# Patient Record
Sex: MALE | Race: WHITE | NOT HISPANIC OR LATINO | Employment: OTHER | URBAN - METROPOLITAN AREA
[De-identification: names, ages, dates, MRNs, and addresses within clinical notes are randomized per-mention and may not be internally consistent; named-entity substitution may affect disease eponyms.]

---

## 2020-11-13 ENCOUNTER — OFFICE VISIT (OUTPATIENT)
Dept: URGENT CARE | Facility: CLINIC | Age: 85
End: 2020-11-13
Payer: COMMERCIAL

## 2020-11-13 VITALS
DIASTOLIC BLOOD PRESSURE: 83 MMHG | HEIGHT: 72 IN | TEMPERATURE: 99 F | WEIGHT: 170 LBS | RESPIRATION RATE: 18 BRPM | SYSTOLIC BLOOD PRESSURE: 128 MMHG | OXYGEN SATURATION: 96 % | HEART RATE: 74 BPM | BODY MASS INDEX: 23.03 KG/M2

## 2020-11-13 DIAGNOSIS — L30.4 INTERTRIGO: Primary | ICD-10-CM

## 2020-11-13 PROCEDURE — 99203 OFFICE O/P NEW LOW 30 MIN: CPT | Mod: S$GLB,,, | Performed by: PHYSICIAN ASSISTANT

## 2020-11-13 PROCEDURE — 99203 PR OFFICE/OUTPT VISIT, NEW, LEVL III, 30-44 MIN: ICD-10-PCS | Mod: S$GLB,,, | Performed by: PHYSICIAN ASSISTANT

## 2020-11-13 RX ORDER — POTASSIUM CHLORIDE 750 MG/1
10 TABLET, EXTENDED RELEASE ORAL
COMMUNITY
End: 2023-01-23 | Stop reason: DRUGHIGH

## 2020-11-13 RX ORDER — CHOLECALCIFEROL (VITAMIN D3) 50 MCG
2000 TABLET ORAL
COMMUNITY
End: 2023-01-23 | Stop reason: DRUGHIGH

## 2020-11-13 RX ORDER — ATORVASTATIN CALCIUM 80 MG/1
80 TABLET, FILM COATED ORAL
COMMUNITY
End: 2023-01-23 | Stop reason: DRUGHIGH

## 2020-11-13 RX ORDER — SOTALOL HYDROCHLORIDE 80 MG/1
80 TABLET ORAL
COMMUNITY
End: 2023-01-23

## 2020-11-13 RX ORDER — FLUCONAZOLE 200 MG/1
200 TABLET ORAL ONCE
Qty: 1 TABLET | Refills: 0 | Status: SHIPPED | OUTPATIENT
Start: 2020-11-13 | End: 2020-11-13

## 2020-11-13 RX ORDER — DOXYLAMINE SUCCINATE 25 MG
TABLET ORAL 2 TIMES DAILY
Qty: 141 G | Refills: 1 | Status: SHIPPED | OUTPATIENT
Start: 2020-11-13 | End: 2023-01-23

## 2020-11-13 RX ORDER — AMLODIPINE BESYLATE 10 MG/1
10 TABLET ORAL
COMMUNITY
End: 2023-01-23 | Stop reason: DRUGHIGH

## 2020-11-13 RX ORDER — LOSARTAN POTASSIUM 50 MG/1
50 TABLET ORAL DAILY
COMMUNITY
Start: 2020-09-18 | End: 2023-01-23 | Stop reason: DRUGHIGH

## 2020-11-13 RX ORDER — METOPROLOL SUCCINATE 25 MG/1
TABLET, EXTENDED RELEASE ORAL
COMMUNITY
Start: 2020-11-05 | End: 2020-11-13

## 2020-11-13 RX ORDER — DOXYLAMINE SUCCINATE 25 MG
TABLET ORAL 2 TIMES DAILY
Qty: 141 G | Refills: 1 | Status: SHIPPED | OUTPATIENT
Start: 2020-11-13 | End: 2020-11-13

## 2020-11-13 RX ORDER — LEVOTHYROXINE SODIUM 50 UG/1
50 TABLET ORAL EVERY MORNING
COMMUNITY
Start: 2020-11-05 | End: 2022-09-08 | Stop reason: SDUPTHER

## 2020-11-13 RX ORDER — LANOLIN ALCOHOL/MO/W.PET/CERES
400 CREAM (GRAM) TOPICAL NIGHTLY
COMMUNITY

## 2020-11-13 NOTE — PROGRESS NOTES
Subjective:       Patient ID: Yunier Aguillon is a 89 y.o. male.    Vitals:  height is 6' (1.829 m) and weight is 77.1 kg (170 lb). His oral temperature is 98.7 °F (37.1 °C). His blood pressure is 128/83 and his pulse is 74. His respiration is 18 and oxygen saturation is 96%.     Chief Complaint: Rash    89 y.o. with hx HTN, hyperlipidemia and cad with rash to groin for 2 days. Has mild urinary incontinence -Started leaking urine , wife bough humana pads to put in underwear and Patient has developed a rash.  Rash is not painful or itchy.  No fever or drainage     Rash  This is a new problem. The problem has been gradually worsening since onset. The affected locations include the groin (groin). The rash is characterized by redness. Associated with: humana diaper pads. Pertinent negatives include no cough, diarrhea, eye pain, fever, shortness of breath or vomiting. Past treatments include antibiotic cream (antibiotic cream). The treatment provided no relief.       Constitution: Negative. Negative for sweating and fever.   HENT: Negative.  Negative for ear pain and ear discharge.    Neck: negative. Negative for neck pain and neck stiffness.   Cardiovascular: Negative.  Negative for chest pain and palpitations.   Eyes: Negative.  Negative for eye discharge and eye pain.   Respiratory: Negative.  Negative for cough and shortness of breath.    Gastrointestinal: Negative.  Negative for abdominal pain, nausea, vomiting and diarrhea.   Genitourinary: Negative.  Negative for dysuria, urgency, urine decreased and flank pain.   Musculoskeletal: Negative.    Skin: Positive for rash. Negative for wound and erythema.   Allergic/Immunologic: Negative.    Neurological: Negative.  Negative for headaches.   Hematologic/Lymphatic: Negative.    Psychiatric/Behavioral: Negative.        Objective:      Physical Exam   Constitutional: He is oriented to person, place, and time. He appears well-developed.   HENT:   Head: Normocephalic and  atraumatic. Head is without abrasion, without contusion and without laceration.   Ears:   Right Ear: External ear normal.   Left Ear: External ear normal.   Nose: Nose normal.   Mouth/Throat: Oropharynx is clear and moist and mucous membranes are normal.   Eyes: Pupils are equal, round, and reactive to light. Conjunctivae, EOM and lids are normal.   Neck: Trachea normal, full passive range of motion without pain and phonation normal. Neck supple.   Cardiovascular: Normal rate, regular rhythm and normal heart sounds.   Pulmonary/Chest: Effort normal and breath sounds normal. No stridor. No respiratory distress.   Musculoskeletal: Normal range of motion.   Neurological: He is alert and oriented to person, place, and time.   Skin: Skin is warm. abrasion, burn, bruising, erythema and ecchymosis     Psychiatric: His speech is normal and behavior is normal. Judgment and thought content normal.   Nursing note and vitals reviewed.        Assessment:       1. Intertrigo        Plan:       tx for intertrigo. Given home care instructions. Recommend f/u with PCP early next week for recheck, miley if worsening in any way.   Intertrigo    Other orders  -     miconazole (MICOTIN) 2 % cream; Apply topically 2 (two) times daily.  Dispense: 141 g; Refill: 1  -     fluconazole (DIFLUCAN) 200 MG Tab; Take 1 tablet (200 mg total) by mouth once. for 1 dose  Dispense: 1 tablet; Refill: 0        [facility]         Patient Instructions     Take oral pill and cream 2 x daily for at least 14 days or resolved. Clean with regular soap and water, pat dry thoroughly prior to cream. Use barrier cream in the future to prevent such as zinc oxide. needs repeat eval if any fever, spreading redness, purulent drainage. Candida Skin Infection (Adult)  Candida is type of yeast. It grows naturally on the skin and in the mouth. If it grows out of control, it can cause an infection. Candida can cause infections in the  genital area, skin folds, in the mouth, and under the breasts. Anyone can get this infection. It is more common in a person with a weak immune system, such as from diabetes, HIV, or cancer. Its also more common in someone who has been on antibiotic therapy. And its more common people who are overweight or who have incontinence. Wearing tight-fitting clothing and taking part in activities with lots of skin-to-skin contact can also put you at risk.  Candida causes the skin to become bright red and inflamed. The border of the infected part of the skin is often raised. The infection causes pain and itching. Sometimes the skin peels and bleeds. In the mouth, candida is called thrush, and may cause white thickened areas.  A Candida rash is most often treated with an antifungal cream or ointment. The rash will clear a few days after starting the medicine. Infections that dont go away may need a prescription medicine. In rare cases, a bacterial infection can also occur.  Home care  Your healthcare provider will recommend an antifungal cream or ointment for the rash. He or she may also prescribe a medicine for the itch. Follow all instructions for using these medicines. Dont use cornstarch powder. Cornstarch can cause the Candida infection to get worse.  General care:  · Keep your skin clean by washing the area twice a day.  · Use the cream as directed until your rash is gone. Once the skin has healed, keep it dry to prevent another infection.   · If you are overweight, talk with your healthcare provider about a plan to lose excess weight.  · Avoid clothes that fit tightly.  Follow-up care  Follow up with your healthcare provider, or as advised. Your rash will clear in 7 to 14 days. Call your healthcare provider if the rash is not gone after 14 days.  When to seek medical advice  Call your healthcare provider right away if any of these occur:  · Pain or redness that gets worse or spreads  · Fluid coming from the  skin  · Yellow crusts on the skin  · Fever of 100.4°F (38°C) or higher, or as directed by your healthcare provider  Date Last Reviewed: 9/1/2016  © 3901-8330 [employer]. [address] [place] [address], [place] [address]. All rights reserved. This information is not intended as a substitute for professional medical care. Always follow your healthcare professional's instructions.

## 2020-11-13 NOTE — PATIENT INSTRUCTIONS
Take oral pill and cream 2 x daily for at least 14 days or resolved. Clean with regular soap and water, pat dry thoroughly prior to cream. Use barrier cream in the future to prevent such as zinc oxide. needs repeat eval if any fever, spreading redness, purulent drainage. Candida Skin Infection (Adult)  Candida is type of yeast. It grows naturally on the skin and in the mouth. If it grows out of control, it can cause an infection. Candida can cause infections in the genital area, skin folds, in the mouth, and under the breasts. Anyone can get this infection. It is more common in a person with a weak immune system, such as from diabetes, HIV, or cancer. Its also more common in someone who has been on antibiotic therapy. And its more common people who are overweight or who have incontinence. Wearing tight-fitting clothing and taking part in activities with lots of skin-to-skin contact can also put you at risk.  Candida causes the skin to become bright red and inflamed. The border of the infected part of the skin is often raised. The infection causes pain and itching. Sometimes the skin peels and bleeds. In the mouth, candida is called thrush, and may cause white thickened areas.  A Candida rash is most often treated with an antifungal cream or ointment. The rash will clear a few days after starting the medicine. Infections that dont go away may need a prescription medicine. In rare cases, a bacterial infection can also occur.  Home care  Your healthcare provider will recommend an antifungal cream or ointment for the rash. He or she may also prescribe a medicine for the itch. Follow all instructions for using these medicines. Dont use cornstarch powder. Cornstarch can cause the Candida infection to get worse.  General care:  · Keep your skin clean by washing the area twice a day.  · Use the cream as directed until your rash is gone. Once the skin has healed, keep it dry to prevent another infection.   · If you are  overweight, talk with your healthcare provider about a plan to lose excess weight.  · Avoid clothes that fit tightly.  Follow-up care  Follow up with your healthcare provider, or as advised. Your rash will clear in 7 to 14 days. Call your healthcare provider if the rash is not gone after 14 days.  When to seek medical advice  Call your healthcare provider right away if any of these occur:  · Pain or redness that gets worse or spreads  · Fluid coming from the skin  · Yellow crusts on the skin  · Fever of 100.4°F (38°C) or higher, or as directed by your healthcare provider  Date Last Reviewed: 9/1/2016  © 8768-7119 [employer]. [address] [place] [address], [place] [address]. All rights reserved. This information is not intended as a substitute for professional medical care. Always follow your healthcare professional's instructions.

## 2020-11-16 ENCOUNTER — TELEPHONE (OUTPATIENT)
Dept: URGENT CARE | Facility: CLINIC | Age: 85
End: 2020-11-16

## 2020-11-20 ENCOUNTER — TELEPHONE (OUTPATIENT)
Dept: URGENT CARE | Facility: CLINIC | Age: 85
End: 2020-11-20

## 2020-11-20 NOTE — TELEPHONE ENCOUNTER
Courtesy call to check on patient. No answer. Left message to return call to clinic if any questions or concerns. [facility] Garduno

## 2020-12-03 ENCOUNTER — LAB VISIT (OUTPATIENT)
Dept: PRIMARY CARE CLINIC | Facility: OTHER | Age: 85
End: 2020-12-03
Attending: INTERNAL MEDICINE
Payer: MEDICARE

## 2020-12-03 DIAGNOSIS — Z03.818 ENCOUNTER FOR OBSERVATION FOR SUSPECTED EXPOSURE TO OTHER BIOLOGICAL AGENTS RULED OUT: Primary | ICD-10-CM

## 2020-12-03 PROCEDURE — U0003 INFECTIOUS AGENT DETECTION BY NUCLEIC ACID (DNA OR RNA); SEVERE ACUTE RESPIRATORY SYNDROME CORONAVIRUS 2 (SARS-COV-2) (CORONAVIRUS DISEASE [COVID-19]), AMPLIFIED PROBE TECHNIQUE, MAKING USE OF HIGH THROUGHPUT TECHNOLOGIES AS DESCRIBED BY CMS-2020-01-R: HCPCS | Mod: ST72

## 2020-12-07 LAB — SARS-COV-2 RNA RESP QL NAA+PROBE: NOT DETECTED

## 2021-01-14 ENCOUNTER — IMMUNIZATION (OUTPATIENT)
Dept: INTERNAL MEDICINE | Facility: CLINIC | Age: 86
End: 2021-01-14
Payer: MEDICARE

## 2021-01-14 DIAGNOSIS — Z23 NEED FOR VACCINATION: ICD-10-CM

## 2021-01-14 PROCEDURE — 91300 COVID-19, MRNA, LNP-S, PF, 30 MCG/0.3 ML DOSE VACCINE: CPT | Mod: PBBFAC | Performed by: FAMILY MEDICINE

## 2021-01-17 ENCOUNTER — OFFICE VISIT (OUTPATIENT)
Dept: URGENT CARE | Facility: CLINIC | Age: 86
End: 2021-01-17
Payer: MEDICARE

## 2021-01-17 VITALS
HEART RATE: 98 BPM | OXYGEN SATURATION: 99 % | RESPIRATION RATE: 16 BRPM | TEMPERATURE: 99 F | SYSTOLIC BLOOD PRESSURE: 107 MMHG | DIASTOLIC BLOOD PRESSURE: 65 MMHG

## 2021-01-17 DIAGNOSIS — S91.301A AVULSION OF SKIN OF RIGHT FOOT, INITIAL ENCOUNTER: Primary | ICD-10-CM

## 2021-01-17 PROCEDURE — 1126F AMNT PAIN NOTED NONE PRSNT: CPT | Mod: S$GLB,,, | Performed by: NURSE PRACTITIONER

## 2021-01-17 PROCEDURE — 1126F PR PAIN SEVERITY QUANTIFIED, NO PAIN PRESENT: ICD-10-PCS | Mod: S$GLB,,, | Performed by: NURSE PRACTITIONER

## 2021-01-17 PROCEDURE — 99213 OFFICE O/P EST LOW 20 MIN: CPT | Mod: S$GLB,,, | Performed by: NURSE PRACTITIONER

## 2021-01-17 PROCEDURE — 99213 PR OFFICE/OUTPT VISIT, EST, LEVL III, 20-29 MIN: ICD-10-PCS | Mod: S$GLB,,, | Performed by: NURSE PRACTITIONER

## 2021-01-21 ENCOUNTER — TELEPHONE (OUTPATIENT)
Dept: URGENT CARE | Facility: CLINIC | Age: 86
End: 2021-01-21

## 2021-02-04 ENCOUNTER — IMMUNIZATION (OUTPATIENT)
Dept: INTERNAL MEDICINE | Facility: CLINIC | Age: 86
End: 2021-02-04
Payer: MEDICARE

## 2021-02-04 DIAGNOSIS — Z23 NEED FOR VACCINATION: Primary | ICD-10-CM

## 2021-02-04 PROCEDURE — 91300 COVID-19, MRNA, LNP-S, PF, 30 MCG/0.3 ML DOSE VACCINE: CPT | Mod: PBBFAC | Performed by: FAMILY MEDICINE

## 2021-02-04 PROCEDURE — 0002A COVID-19, MRNA, LNP-S, PF, 30 MCG/0.3 ML DOSE VACCINE: CPT | Mod: PBBFAC | Performed by: FAMILY MEDICINE

## 2021-04-16 ENCOUNTER — OFFICE VISIT (OUTPATIENT)
Dept: URGENT CARE | Facility: CLINIC | Age: 86
End: 2021-04-16
Payer: MEDICARE

## 2021-04-16 VITALS
SYSTOLIC BLOOD PRESSURE: 150 MMHG | HEART RATE: 84 BPM | WEIGHT: 200 LBS | DIASTOLIC BLOOD PRESSURE: 87 MMHG | TEMPERATURE: 98 F | HEIGHT: 72 IN | OXYGEN SATURATION: 99 % | BODY MASS INDEX: 27.09 KG/M2 | RESPIRATION RATE: 16 BRPM

## 2021-04-16 DIAGNOSIS — T07.XXXA ABRASIONS OF MULTIPLE SITES: Primary | ICD-10-CM

## 2021-04-16 PROBLEM — I70.0 ATHEROSCLEROSIS OF AORTA: Status: ACTIVE | Noted: 2017-04-17

## 2021-04-16 PROBLEM — F01.50 VASCULAR DEMENTIA WITHOUT BEHAVIORAL DISTURBANCE: Status: ACTIVE | Noted: 2020-12-07

## 2021-04-16 PROBLEM — E03.9 HYPOTHYROIDISM, UNSPECIFIED: Status: ACTIVE | Noted: 2017-04-22

## 2021-04-16 PROBLEM — I10 HYPERTENSION: Status: ACTIVE | Noted: 2021-04-16

## 2021-04-16 PROBLEM — G47.30 SLEEP APNEA: Status: ACTIVE | Noted: 2019-07-17

## 2021-04-16 PROBLEM — I63.9 CARDIOEMBOLIC STROKE: Status: ACTIVE | Noted: 2020-12-07

## 2021-04-16 PROBLEM — I35.0 AORTIC STENOSIS, SEVERE: Status: ACTIVE | Noted: 2019-07-24

## 2021-04-16 PROBLEM — I48.91 ATRIAL FIBRILLATION: Status: ACTIVE | Noted: 2018-12-17

## 2021-04-16 PROBLEM — E78.5 HYPERLIPIDEMIA: Status: ACTIVE | Noted: 2019-07-17

## 2021-04-16 PROBLEM — N28.1 RENAL CYST: Status: ACTIVE | Noted: 2020-01-05

## 2021-04-16 PROBLEM — J45.909 ASTHMA: Status: ACTIVE | Noted: 2021-04-16

## 2021-04-16 PROBLEM — R26.89 BALANCE DISORDER: Status: ACTIVE | Noted: 2019-08-29

## 2021-04-16 PROCEDURE — 3288F FALL RISK ASSESSMENT DOCD: CPT | Mod: CPTII,S$GLB,, | Performed by: EMERGENCY MEDICINE

## 2021-04-16 PROCEDURE — 3288F PR FALLS RISK ASSESSMENT DOCUMENTED: ICD-10-PCS | Mod: CPTII,S$GLB,, | Performed by: EMERGENCY MEDICINE

## 2021-04-16 PROCEDURE — 1101F PR PT FALLS ASSESS DOC 0-1 FALLS W/OUT INJ PAST YR: ICD-10-PCS | Mod: CPTII,S$GLB,, | Performed by: EMERGENCY MEDICINE

## 2021-04-16 PROCEDURE — 1126F AMNT PAIN NOTED NONE PRSNT: CPT | Mod: S$GLB,,, | Performed by: EMERGENCY MEDICINE

## 2021-04-16 PROCEDURE — 1101F PT FALLS ASSESS-DOCD LE1/YR: CPT | Mod: CPTII,S$GLB,, | Performed by: EMERGENCY MEDICINE

## 2021-04-16 PROCEDURE — 1126F PR PAIN SEVERITY QUANTIFIED, NO PAIN PRESENT: ICD-10-PCS | Mod: S$GLB,,, | Performed by: EMERGENCY MEDICINE

## 2021-04-16 PROCEDURE — 99213 PR OFFICE/OUTPT VISIT, EST, LEVL III, 20-29 MIN: ICD-10-PCS | Mod: S$GLB,,, | Performed by: EMERGENCY MEDICINE

## 2021-04-16 PROCEDURE — 99213 OFFICE O/P EST LOW 20 MIN: CPT | Mod: S$GLB,,, | Performed by: EMERGENCY MEDICINE

## 2021-04-19 ENCOUNTER — TELEPHONE (OUTPATIENT)
Dept: URGENT CARE | Facility: CLINIC | Age: 86
End: 2021-04-19

## 2021-05-27 ENCOUNTER — OFFICE VISIT (OUTPATIENT)
Dept: URGENT CARE | Facility: CLINIC | Age: 86
End: 2021-05-27
Payer: MEDICARE

## 2021-05-27 VITALS
RESPIRATION RATE: 16 BRPM | BODY MASS INDEX: 28.14 KG/M2 | HEIGHT: 69 IN | SYSTOLIC BLOOD PRESSURE: 142 MMHG | DIASTOLIC BLOOD PRESSURE: 83 MMHG | TEMPERATURE: 98 F | HEART RATE: 86 BPM | OXYGEN SATURATION: 97 % | WEIGHT: 190 LBS

## 2021-05-27 DIAGNOSIS — R09.82 ALLERGIC RHINITIS WITH POSTNASAL DRIP: ICD-10-CM

## 2021-05-27 DIAGNOSIS — J20.9 ACUTE BRONCHITIS, UNSPECIFIED ORGANISM: Primary | ICD-10-CM

## 2021-05-27 DIAGNOSIS — J30.9 ALLERGIC RHINITIS WITH POSTNASAL DRIP: ICD-10-CM

## 2021-05-27 DIAGNOSIS — R05.9 COUGH: ICD-10-CM

## 2021-05-27 PROCEDURE — 1125F AMNT PAIN NOTED PAIN PRSNT: CPT | Mod: S$GLB,,, | Performed by: NURSE PRACTITIONER

## 2021-05-27 PROCEDURE — 99214 PR OFFICE/OUTPT VISIT, EST, LEVL IV, 30-39 MIN: ICD-10-PCS | Mod: S$GLB,,, | Performed by: NURSE PRACTITIONER

## 2021-05-27 PROCEDURE — 1125F PR PAIN SEVERITY QUANTIFIED, PAIN PRESENT: ICD-10-PCS | Mod: S$GLB,,, | Performed by: NURSE PRACTITIONER

## 2021-05-27 PROCEDURE — 99214 OFFICE O/P EST MOD 30 MIN: CPT | Mod: S$GLB,,, | Performed by: NURSE PRACTITIONER

## 2021-05-27 RX ORDER — FLUTICASONE PROPIONATE 50 MCG
2 SPRAY, SUSPENSION (ML) NASAL DAILY
Qty: 16 G | Refills: 0 | Status: SHIPPED | OUTPATIENT
Start: 2021-05-27 | End: 2021-06-26

## 2021-05-27 RX ORDER — DOXYCYCLINE HYCLATE 100 MG
100 TABLET ORAL 2 TIMES DAILY
Qty: 14 TABLET | Refills: 0 | Status: SHIPPED | OUTPATIENT
Start: 2021-05-27 | End: 2021-06-03

## 2021-05-30 ENCOUNTER — TELEPHONE (OUTPATIENT)
Dept: URGENT CARE | Facility: CLINIC | Age: 86
End: 2021-05-30

## 2021-07-12 ENCOUNTER — OFFICE VISIT (OUTPATIENT)
Dept: URGENT CARE | Facility: CLINIC | Age: 86
End: 2021-07-12
Payer: MEDICARE

## 2021-07-12 VITALS
HEIGHT: 69 IN | RESPIRATION RATE: 12 BRPM | WEIGHT: 218 LBS | TEMPERATURE: 98 F | DIASTOLIC BLOOD PRESSURE: 85 MMHG | SYSTOLIC BLOOD PRESSURE: 127 MMHG | OXYGEN SATURATION: 97 % | HEART RATE: 96 BPM | BODY MASS INDEX: 32.29 KG/M2

## 2021-07-12 DIAGNOSIS — H61.23 HEARING LOSS DUE TO CERUMEN IMPACTION, BILATERAL: Primary | ICD-10-CM

## 2021-07-12 DIAGNOSIS — H92.02 ACUTE OTALGIA, LEFT: ICD-10-CM

## 2021-07-12 PROCEDURE — 1126F AMNT PAIN NOTED NONE PRSNT: CPT | Mod: S$GLB,,, | Performed by: NURSE PRACTITIONER

## 2021-07-12 PROCEDURE — 99214 OFFICE O/P EST MOD 30 MIN: CPT | Mod: S$GLB,,, | Performed by: NURSE PRACTITIONER

## 2021-07-12 PROCEDURE — 99214 PR OFFICE/OUTPT VISIT, EST, LEVL IV, 30-39 MIN: ICD-10-PCS | Mod: S$GLB,,, | Performed by: NURSE PRACTITIONER

## 2021-07-12 PROCEDURE — 1126F PR PAIN SEVERITY QUANTIFIED, NO PAIN PRESENT: ICD-10-PCS | Mod: S$GLB,,, | Performed by: NURSE PRACTITIONER

## 2021-07-12 RX ORDER — OFLOXACIN 3 MG/ML
10 SOLUTION AURICULAR (OTIC) DAILY
Qty: 70 DROP | Refills: 0 | Status: SHIPPED | OUTPATIENT
Start: 2021-07-12 | End: 2021-07-19

## 2021-07-15 ENCOUNTER — TELEPHONE (OUTPATIENT)
Dept: URGENT CARE | Facility: CLINIC | Age: 86
End: 2021-07-15

## 2021-10-17 ENCOUNTER — OFFICE VISIT (OUTPATIENT)
Dept: URGENT CARE | Facility: CLINIC | Age: 86
End: 2021-10-17
Payer: MEDICARE

## 2021-10-17 ENCOUNTER — HOSPITAL ENCOUNTER (OUTPATIENT)
Dept: RADIOLOGY | Facility: CLINIC | Age: 86
Discharge: HOME | End: 2021-10-17
Attending: NURSE PRACTITIONER
Payer: MEDICARE

## 2021-10-17 VITALS
HEART RATE: 94 BPM | DIASTOLIC BLOOD PRESSURE: 90 MMHG | SYSTOLIC BLOOD PRESSURE: 139 MMHG | RESPIRATION RATE: 16 BRPM | OXYGEN SATURATION: 98 % | HEIGHT: 71 IN | TEMPERATURE: 98 F | WEIGHT: 212 LBS | BODY MASS INDEX: 29.68 KG/M2

## 2021-10-17 DIAGNOSIS — R05.9 COUGH: ICD-10-CM

## 2021-10-17 DIAGNOSIS — J18.9 PNEUMONIA OF LEFT LOWER LOBE DUE TO INFECTIOUS ORGANISM: ICD-10-CM

## 2021-10-17 DIAGNOSIS — R05.9 COUGH: Primary | ICD-10-CM

## 2021-10-17 LAB
CTP QC/QA: YES
SARS-COV-2 RDRP RESP QL NAA+PROBE: NEGATIVE

## 2021-10-17 PROCEDURE — 71046 XR CHEST PA AND LATERAL: ICD-10-PCS | Mod: S$GLB,,, | Performed by: RADIOLOGY

## 2021-10-17 PROCEDURE — 71046 X-RAY EXAM CHEST 2 VIEWS: CPT | Mod: S$GLB,,, | Performed by: RADIOLOGY

## 2021-10-17 PROCEDURE — U0002 COVID-19 LAB TEST NON-CDC: HCPCS | Mod: QW,S$GLB,, | Performed by: NURSE PRACTITIONER

## 2021-10-17 PROCEDURE — U0002: ICD-10-PCS | Mod: QW,S$GLB,, | Performed by: NURSE PRACTITIONER

## 2021-10-17 PROCEDURE — 1159F MED LIST DOCD IN RCRD: CPT | Mod: CPTII,S$GLB,, | Performed by: NURSE PRACTITIONER

## 2021-10-17 PROCEDURE — 99214 OFFICE O/P EST MOD 30 MIN: CPT | Mod: S$GLB,,, | Performed by: NURSE PRACTITIONER

## 2021-10-17 PROCEDURE — 99214 PR OFFICE/OUTPT VISIT, EST, LEVL IV, 30-39 MIN: ICD-10-PCS | Mod: S$GLB,,, | Performed by: NURSE PRACTITIONER

## 2021-10-17 PROCEDURE — 1159F PR MEDICATION LIST DOCUMENTED IN MEDICAL RECORD: ICD-10-PCS | Mod: CPTII,S$GLB,, | Performed by: NURSE PRACTITIONER

## 2021-10-17 RX ORDER — BENZONATATE 200 MG/1
200 CAPSULE ORAL 3 TIMES DAILY PRN
Qty: 30 CAPSULE | Refills: 0 | Status: SHIPPED | OUTPATIENT
Start: 2021-10-17 | End: 2021-10-27

## 2021-10-17 RX ORDER — DOXYCYCLINE 100 MG/1
100 CAPSULE ORAL EVERY 12 HOURS
Qty: 14 CAPSULE | Refills: 0 | Status: SHIPPED | OUTPATIENT
Start: 2021-10-17 | End: 2021-10-24

## 2021-10-20 ENCOUNTER — TELEPHONE (OUTPATIENT)
Dept: URGENT CARE | Facility: CLINIC | Age: 86
End: 2021-10-20

## 2021-10-26 ENCOUNTER — HOSPITAL ENCOUNTER (EMERGENCY)
Facility: HOSPITAL | Age: 86
Discharge: HOME | End: 2021-10-27
Attending: EMERGENCY MEDICINE
Payer: MEDICARE

## 2021-10-26 ENCOUNTER — HOSPITAL ENCOUNTER (OUTPATIENT)
Dept: RADIOLOGY | Facility: CLINIC | Age: 86
Discharge: HOME | End: 2021-10-26
Attending: PHYSICIAN ASSISTANT
Payer: MEDICARE

## 2021-10-26 ENCOUNTER — OFFICE VISIT (OUTPATIENT)
Dept: URGENT CARE | Facility: CLINIC | Age: 86
End: 2021-10-26
Payer: MEDICARE

## 2021-10-26 VITALS
SYSTOLIC BLOOD PRESSURE: 123 MMHG | HEIGHT: 71 IN | RESPIRATION RATE: 16 BRPM | OXYGEN SATURATION: 98 % | BODY MASS INDEX: 29.68 KG/M2 | TEMPERATURE: 98 F | HEART RATE: 93 BPM | DIASTOLIC BLOOD PRESSURE: 76 MMHG | WEIGHT: 212 LBS

## 2021-10-26 VITALS
OXYGEN SATURATION: 98 % | DIASTOLIC BLOOD PRESSURE: 99 MMHG | SYSTOLIC BLOOD PRESSURE: 160 MMHG | HEIGHT: 71 IN | WEIGHT: 220 LBS | RESPIRATION RATE: 19 BRPM | TEMPERATURE: 99 F | BODY MASS INDEX: 30.8 KG/M2 | HEART RATE: 88 BPM

## 2021-10-26 DIAGNOSIS — I50.9 CONGESTIVE HEART FAILURE, UNSPECIFIED HF CHRONICITY, UNSPECIFIED HEART FAILURE TYPE: Primary | ICD-10-CM

## 2021-10-26 DIAGNOSIS — R06.02 SOB (SHORTNESS OF BREATH): ICD-10-CM

## 2021-10-26 DIAGNOSIS — R06.2 WHEEZING: ICD-10-CM

## 2021-10-26 DIAGNOSIS — R05.9 COUGH: ICD-10-CM

## 2021-10-26 DIAGNOSIS — Z87.01 H/O: PNEUMONIA: Primary | ICD-10-CM

## 2021-10-26 LAB
ALBUMIN SERPL BCP-MCNC: 3.5 G/DL (ref 3.5–5.2)
ALP SERPL-CCNC: 109 U/L (ref 55–135)
ALT SERPL W/O P-5'-P-CCNC: 27 U/L (ref 10–44)
ANION GAP SERPL CALC-SCNC: 13 MMOL/L (ref 8–16)
AST SERPL-CCNC: 21 U/L (ref 10–40)
BASOPHILS # BLD AUTO: 0.02 K/UL (ref 0–0.2)
BASOPHILS NFR BLD: 0.3 % (ref 0–1.9)
BILIRUB SERPL-MCNC: 1.2 MG/DL (ref 0.1–1)
BNP SERPL-MCNC: 163 PG/ML (ref 0–99)
BUN SERPL-MCNC: 21 MG/DL (ref 10–30)
CALCIUM SERPL-MCNC: 9.3 MG/DL (ref 8.7–10.5)
CHLORIDE SERPL-SCNC: 105 MMOL/L (ref 95–110)
CO2 SERPL-SCNC: 23 MMOL/L (ref 23–29)
CREAT SERPL-MCNC: 1.1 MG/DL (ref 0.5–1.4)
DIFFERENTIAL METHOD: ABNORMAL
EOSINOPHIL # BLD AUTO: 0 K/UL (ref 0–0.5)
EOSINOPHIL NFR BLD: 0.4 % (ref 0–8)
ERYTHROCYTE [DISTWIDTH] IN BLOOD BY AUTOMATED COUNT: 13.6 % (ref 11.5–14.5)
EST. GFR  (AFRICAN AMERICAN): >60 ML/MIN/1.73 M^2
EST. GFR  (NON AFRICAN AMERICAN): 58 ML/MIN/1.73 M^2
GLUCOSE SERPL-MCNC: 138 MG/DL (ref 70–110)
HCT VFR BLD AUTO: 39.7 % (ref 40–54)
HGB BLD-MCNC: 13.4 G/DL (ref 14–18)
IMM GRANULOCYTES # BLD AUTO: 0.1 K/UL (ref 0–0.04)
IMM GRANULOCYTES NFR BLD AUTO: 1.3 % (ref 0–0.5)
LACTATE SERPL-SCNC: 2.2 MMOL/L (ref 0.5–2.2)
LYMPHOCYTES # BLD AUTO: 0.8 K/UL (ref 1–4.8)
LYMPHOCYTES NFR BLD: 9.7 % (ref 18–48)
MCH RBC QN AUTO: 31 PG (ref 27–31)
MCHC RBC AUTO-ENTMCNC: 33.8 G/DL (ref 32–36)
MCV RBC AUTO: 92 FL (ref 82–98)
MONOCYTES # BLD AUTO: 0.3 K/UL (ref 0.3–1)
MONOCYTES NFR BLD: 3.9 % (ref 4–15)
NEUTROPHILS # BLD AUTO: 6.6 K/UL (ref 1.8–7.7)
NEUTROPHILS NFR BLD: 84.4 % (ref 38–73)
NRBC BLD-RTO: 0 /100 WBC
PLATELET # BLD AUTO: 180 K/UL (ref 150–450)
PMV BLD AUTO: 8.6 FL (ref 9.2–12.9)
POTASSIUM SERPL-SCNC: 4.3 MMOL/L (ref 3.5–5.1)
PROT SERPL-MCNC: 6.4 G/DL (ref 6–8.4)
RBC # BLD AUTO: 4.32 M/UL (ref 4.6–6.2)
SODIUM SERPL-SCNC: 141 MMOL/L (ref 136–145)
TROPONIN I SERPL DL<=0.01 NG/ML-MCNC: <0.006 NG/ML (ref 0–0.03)
WBC # BLD AUTO: 7.75 K/UL (ref 3.9–12.7)

## 2021-10-26 PROCEDURE — 84484 ASSAY OF TROPONIN QUANT: CPT | Performed by: NURSE PRACTITIONER

## 2021-10-26 PROCEDURE — 1160F RVW MEDS BY RX/DR IN RCRD: CPT | Mod: CPTII,S$GLB,, | Performed by: PHYSICIAN ASSISTANT

## 2021-10-26 PROCEDURE — 94640 PR INHAL RX, AIRWAY OBST/DX SPUTUM INDUCT: ICD-10-PCS | Mod: S$GLB,,, | Performed by: FAMILY MEDICINE

## 2021-10-26 PROCEDURE — 85025 COMPLETE CBC W/AUTO DIFF WBC: CPT | Performed by: NURSE PRACTITIONER

## 2021-10-26 PROCEDURE — 99214 PR OFFICE/OUTPT VISIT, EST, LEVL IV, 30-39 MIN: ICD-10-PCS | Mod: 25,S$GLB,, | Performed by: PHYSICIAN ASSISTANT

## 2021-10-26 PROCEDURE — 1159F PR MEDICATION LIST DOCUMENTED IN MEDICAL RECORD: ICD-10-PCS | Mod: CPTII,S$GLB,, | Performed by: PHYSICIAN ASSISTANT

## 2021-10-26 PROCEDURE — 96372 PR INJECTION,THERAP/PROPH/DIAG2ST, IM OR SUBCUT: ICD-10-PCS | Mod: S$GLB,,, | Performed by: FAMILY MEDICINE

## 2021-10-26 PROCEDURE — 1126F AMNT PAIN NOTED NONE PRSNT: CPT | Mod: CPTII,S$GLB,, | Performed by: PHYSICIAN ASSISTANT

## 2021-10-26 PROCEDURE — 83880 ASSAY OF NATRIURETIC PEPTIDE: CPT | Performed by: NURSE PRACTITIONER

## 2021-10-26 PROCEDURE — 83605 ASSAY OF LACTIC ACID: CPT | Performed by: NURSE PRACTITIONER

## 2021-10-26 PROCEDURE — 1160F PR REVIEW ALL MEDS BY PRESCRIBER/CLIN PHARMACIST DOCUMENTED: ICD-10-PCS | Mod: CPTII,S$GLB,, | Performed by: PHYSICIAN ASSISTANT

## 2021-10-26 PROCEDURE — 93010 ELECTROCARDIOGRAM REPORT: CPT | Mod: ,,, | Performed by: INTERNAL MEDICINE

## 2021-10-26 PROCEDURE — 99285 EMERGENCY DEPT VISIT HI MDM: CPT | Mod: 25

## 2021-10-26 PROCEDURE — 71046 XR CHEST PA AND LATERAL: ICD-10-PCS | Mod: S$GLB,,, | Performed by: RADIOLOGY

## 2021-10-26 PROCEDURE — 96372 THER/PROPH/DIAG INJ SC/IM: CPT | Mod: S$GLB,,, | Performed by: FAMILY MEDICINE

## 2021-10-26 PROCEDURE — 93005 ELECTROCARDIOGRAM TRACING: CPT

## 2021-10-26 PROCEDURE — 71046 X-RAY EXAM CHEST 2 VIEWS: CPT | Mod: S$GLB,,, | Performed by: RADIOLOGY

## 2021-10-26 PROCEDURE — 1159F MED LIST DOCD IN RCRD: CPT | Mod: CPTII,S$GLB,, | Performed by: PHYSICIAN ASSISTANT

## 2021-10-26 PROCEDURE — 99214 OFFICE O/P EST MOD 30 MIN: CPT | Mod: 25,S$GLB,, | Performed by: PHYSICIAN ASSISTANT

## 2021-10-26 PROCEDURE — 63600175 PHARM REV CODE 636 W HCPCS: Performed by: EMERGENCY MEDICINE

## 2021-10-26 PROCEDURE — 1126F PR PAIN SEVERITY QUANTIFIED, NO PAIN PRESENT: ICD-10-PCS | Mod: CPTII,S$GLB,, | Performed by: PHYSICIAN ASSISTANT

## 2021-10-26 PROCEDURE — 93010 EKG 12-LEAD: ICD-10-PCS | Mod: ,,, | Performed by: INTERNAL MEDICINE

## 2021-10-26 PROCEDURE — 94640 AIRWAY INHALATION TREATMENT: CPT | Mod: S$GLB,,, | Performed by: FAMILY MEDICINE

## 2021-10-26 PROCEDURE — 80053 COMPREHEN METABOLIC PANEL: CPT | Performed by: NURSE PRACTITIONER

## 2021-10-26 PROCEDURE — 96374 THER/PROPH/DIAG INJ IV PUSH: CPT

## 2021-10-26 RX ORDER — ALBUTEROL SULFATE 0.63 MG/3ML
1.26 SOLUTION RESPIRATORY (INHALATION)
Status: CANCELLED | OUTPATIENT
Start: 2021-10-26 | End: 2021-10-26

## 2021-10-26 RX ORDER — FUROSEMIDE 10 MG/ML
40 INJECTION INTRAMUSCULAR; INTRAVENOUS
Status: COMPLETED | OUTPATIENT
Start: 2021-10-26 | End: 2021-10-26

## 2021-10-26 RX ORDER — IPRATROPIUM BROMIDE 0.5 MG/2.5ML
0.5 SOLUTION RESPIRATORY (INHALATION)
Status: COMPLETED | OUTPATIENT
Start: 2021-10-26 | End: 2021-10-26

## 2021-10-26 RX ORDER — LEVALBUTEROL INHALATION SOLUTION 1.25 MG/3ML
1.25 SOLUTION RESPIRATORY (INHALATION)
Status: DISCONTINUED | OUTPATIENT
Start: 2021-10-26 | End: 2021-10-26

## 2021-10-26 RX ORDER — AMOXICILLIN AND CLAVULANATE POTASSIUM 875; 125 MG/1; MG/1
1 TABLET, FILM COATED ORAL 2 TIMES DAILY
Qty: 20 TABLET | Refills: 0 | Status: SHIPPED | OUTPATIENT
Start: 2021-10-26 | End: 2021-11-05

## 2021-10-26 RX ORDER — CLINDAMYCIN PHOSPHATE 150 MG/ML
600 INJECTION, SOLUTION INTRAVENOUS
Status: COMPLETED | OUTPATIENT
Start: 2021-10-26 | End: 2021-10-26

## 2021-10-26 RX ADMIN — CLINDAMYCIN PHOSPHATE 600 MG: 150 INJECTION, SOLUTION INTRAVENOUS at 05:10

## 2021-10-26 RX ADMIN — FUROSEMIDE 40 MG: 20 INJECTION, SOLUTION INTRAMUSCULAR; INTRAVENOUS at 11:10

## 2021-10-26 RX ADMIN — IPRATROPIUM BROMIDE 0.5 MG: 0.5 SOLUTION RESPIRATORY (INHALATION) at 05:10

## 2021-10-27 PROBLEM — I50.9 CONGESTIVE HEART FAILURE: Status: ACTIVE | Noted: 2021-10-27

## 2021-10-27 RX ORDER — FUROSEMIDE 20 MG/1
20 TABLET ORAL DAILY
Qty: 3 TABLET | Refills: 0 | Status: ON HOLD | OUTPATIENT
Start: 2021-10-27 | End: 2023-01-26 | Stop reason: HOSPADM

## 2021-11-28 ENCOUNTER — OFFICE VISIT (OUTPATIENT)
Dept: URGENT CARE | Facility: CLINIC | Age: 86
End: 2021-11-28
Payer: MEDICARE

## 2021-11-28 VITALS
HEART RATE: 89 BPM | BODY MASS INDEX: 30.8 KG/M2 | OXYGEN SATURATION: 96 % | WEIGHT: 220 LBS | RESPIRATION RATE: 16 BRPM | DIASTOLIC BLOOD PRESSURE: 69 MMHG | SYSTOLIC BLOOD PRESSURE: 128 MMHG | TEMPERATURE: 98 F | HEIGHT: 71 IN

## 2021-11-28 DIAGNOSIS — R06.2 WHEEZING ON EXPIRATION: Primary | ICD-10-CM

## 2021-11-28 DIAGNOSIS — R11.0 NAUSEA: ICD-10-CM

## 2021-11-28 DIAGNOSIS — R05.3 PERSISTENT COUGH: ICD-10-CM

## 2021-11-28 PROCEDURE — 94640 PR INHAL RX, AIRWAY OBST/DX SPUTUM INDUCT: ICD-10-PCS | Mod: S$GLB,,, | Performed by: PHYSICIAN ASSISTANT

## 2021-11-28 PROCEDURE — 99214 PR OFFICE/OUTPT VISIT, EST, LEVL IV, 30-39 MIN: ICD-10-PCS | Mod: 25,S$GLB,, | Performed by: PHYSICIAN ASSISTANT

## 2021-11-28 PROCEDURE — 94640 AIRWAY INHALATION TREATMENT: CPT | Mod: S$GLB,,, | Performed by: PHYSICIAN ASSISTANT

## 2021-11-28 PROCEDURE — 99214 OFFICE O/P EST MOD 30 MIN: CPT | Mod: 25,S$GLB,, | Performed by: PHYSICIAN ASSISTANT

## 2021-11-28 RX ORDER — METHYLPREDNISOLONE 4 MG/1
TABLET ORAL
Qty: 1 EACH | Refills: 0 | Status: SHIPPED | OUTPATIENT
Start: 2021-11-28 | End: 2023-01-23

## 2021-11-28 RX ORDER — ALBUTEROL SULFATE 0.83 MG/ML
2.5 SOLUTION RESPIRATORY (INHALATION)
Status: COMPLETED | OUTPATIENT
Start: 2021-11-28 | End: 2021-11-28

## 2021-11-28 RX ORDER — ONDANSETRON 4 MG/1
4 TABLET, ORALLY DISINTEGRATING ORAL
Status: COMPLETED | OUTPATIENT
Start: 2021-11-28 | End: 2021-11-28

## 2021-11-28 RX ADMIN — ALBUTEROL SULFATE 2.5 MG: 0.83 SOLUTION RESPIRATORY (INHALATION) at 11:11

## 2021-11-28 RX ADMIN — ONDANSETRON 4 MG: 4 TABLET, ORALLY DISINTEGRATING ORAL at 11:11

## 2021-12-01 ENCOUNTER — TELEPHONE (OUTPATIENT)
Dept: URGENT CARE | Facility: CLINIC | Age: 86
End: 2021-12-01
Payer: OTHER GOVERNMENT

## 2022-01-27 ENCOUNTER — OFFICE VISIT (OUTPATIENT)
Dept: URGENT CARE | Facility: CLINIC | Age: 87
End: 2022-01-27
Payer: MEDICARE

## 2022-01-27 DIAGNOSIS — T14.8XXA AVULSION OF SKIN: Primary | ICD-10-CM

## 2022-01-27 DIAGNOSIS — R05.9 COUGH: ICD-10-CM

## 2022-01-27 PROCEDURE — 99214 PR OFFICE/OUTPT VISIT, EST, LEVL IV, 30-39 MIN: ICD-10-PCS | Mod: S$GLB,,, | Performed by: PHYSICIAN ASSISTANT

## 2022-01-27 PROCEDURE — 99214 OFFICE O/P EST MOD 30 MIN: CPT | Mod: S$GLB,,, | Performed by: PHYSICIAN ASSISTANT

## 2022-01-27 RX ORDER — NAPROXEN SODIUM 220 MG/1
81 TABLET, FILM COATED ORAL DAILY
COMMUNITY

## 2022-01-27 RX ORDER — DONEPEZIL HYDROCHLORIDE 5 MG/1
TABLET, FILM COATED ORAL
COMMUNITY
Start: 2021-03-24 | End: 2023-01-23 | Stop reason: DRUGHIGH

## 2022-01-27 RX ORDER — METOPROLOL SUCCINATE 25 MG/1
25 TABLET, EXTENDED RELEASE ORAL 2 TIMES DAILY
COMMUNITY
Start: 2021-08-11 | End: 2023-01-23 | Stop reason: DRUGHIGH

## 2022-01-27 RX ORDER — ALBUTEROL SULFATE 90 UG/1
AEROSOL, METERED RESPIRATORY (INHALATION)
COMMUNITY
Start: 2021-05-29 | End: 2023-01-23

## 2022-01-27 NOTE — PROGRESS NOTES
"Subjective:       Patient ID: Yunier Aguillon is a 89 y.o. male.    Vitals:  height is 5' 11" (1.803 m) (pended) and weight is 96.2 kg (212 lb) (pended). His tympanic temperature is 97.7 °F (36.5 °C) (pended). His blood pressure is 125/83 (pended) and his pulse is 112 (abnormal, pended). His respiration is 16 (pended) and oxygen saturation is 98% (pended).     Chief Complaint: Hand Injury    89-year-old male presents to urgent care with wife stating that he has a open wound on Right hand.  Accidentally fell off triceps.  Denies any further injuries.  Denies loss of consciousness or head trauma.  Patient ambulates with assistance of a walker.  Also complains of mild cough.  Has not tried any over-the-counter medications.      Hand Injury   His dominant hand is their right hand. The incident occurred 12 to 24 hours ago. The incident occurred at home. The injury mechanism was a fall. The pain is present in the right hand. Quality: No pain. The pain does not radiate. The pain is at a severity of 0/10. The patient is experiencing no pain. Pertinent negatives include no chest pain, muscle weakness, numbness or tingling. Nothing aggravates the symptoms. Treatments tried: Neosporin. The treatment provided no relief.       Cardiovascular: Negative for chest pain.   Respiratory: Positive for cough. Negative for chest tightness and sputum production.    Skin: Positive for wound and avulsion. Negative for erythema.   Neurological: Negative for numbness.       Objective:       Vitals:    01/27/22 1115   BP: (P) 125/83   Pulse: (!) (P) 112   Resp: (P) 16   Temp: (P) 97.7 °F (36.5 °C)   TempSrc: (P) Tympanic   SpO2: (P) 98%   Weight: (P) 96.2 kg (212 lb)   Height: (P) 5' 11" (1.803 m)       Physical Exam   Constitutional: He is oriented to person, place, and time. He appears well-developed and well-nourished. He is not intubated.   HENT:   Head: Normocephalic and atraumatic. Head is without abrasion, without contusion and without " laceration.   Ears:   Right Ear: External ear normal.   Left Ear: External ear normal.   Nose: Nose normal.   Mouth/Throat: Oropharynx is clear and moist and mucous membranes are normal.   Eyes: Conjunctivae, EOM and lids are normal. Pupils are equal, round, and reactive to light.   Neck: Trachea normal and phonation normal. Neck supple.   Cardiovascular: Normal rate, regular rhythm and normal heart sounds.   Pulmonary/Chest: Effort normal and breath sounds normal. No accessory muscle usage or stridor. No apnea, no tachypnea and no bradypnea. He is not intubated. No respiratory distress. He has no decreased breath sounds. He has no wheezes. He has no rhonchi. He has no rales. Chest wall is not dull to percussion. He exhibits no bony tenderness.   Musculoskeletal: Normal range of motion.         General: Normal range of motion.        Hands:    Neurological: He is alert and oriented to person, place, and time.   Skin: Skin is warm, dry, intact and no rash. Capillary refill takes less than 2 seconds. No abrasion, No burn, No bruising, No erythema and No ecchymosis   Psychiatric: He has a normal mood and affect. His speech is normal and behavior is normal. Judgment and thought content normal. Cognition and memory  Nursing note and vitals reviewed.        Assessment:       1. Avulsion of skin    2. Cough          Plan:         Avulsion of skin  -     Laceration Repair    Cough                   Patient Instructions   Cough-- Wife reports that patient has Tessalon Perles at home.  Please take as directed for cough.    Make sure you are getting rest and drinking lots of fluids.    You can use cough drops or Cepacol to soothe your sore throat.     You can also take Elderberry and/or Emergen-C (vitamin C) to help boost your immune system.    Skin avulsion-- Keep your dressing on for48 hours. Do not wet Steri-Strips.  - After 48 hours remove the dressing and gently clean wound with soap and water at least twice daily unless  more frequently soiled, then clean more frequently.    - You may apply topical antibiotic ointment to wound to promote healing.   - Clean old antibiotic ointment away before new application.    - Signs of infection include but not limited to:  Increase in redness, increase in pain, purulent (pus) drainage. Contact clinic or ER if infection concerns arise.   - Keep covered when you are out and about, if the dressing becomes wet, change it immediately. Keep open to air when at home.      - You must understand that you have received an Urgent Care treatment only and that you may be released before all of your medical problems are known or treated.   - You, the patient, will arrange for follow up care as instructed with your primary care provider or recommended specialist.   - If your condition worsens or fails to improve we recommend that you receive another evaluation at the ER immediately or contact your PCP to discuss your concerns, or return here.   - Please do not drive or make any important decisions for 24 hours if you have received any pain medications, sedatives or mood altering drugs during your visit.    Disclaimer: This document was drafted with the use of a voice recognition device and is likely to have sound alike errors.

## 2022-01-27 NOTE — PROCEDURES
Laceration Repair    Date/Time: 1/27/2022 11:15 AM  Performed by: Grace You PA-C  Authorized by: Grace You PA-C   Consent Done: Yes  Consent: Verbal consent obtained. Written consent not obtained.  Risks and benefits: risks, benefits and alternatives were discussed  Consent given by: patient  Patient identity confirmed: name and verbally with patient  Body area: upper extremity  Location details: right hand  Foreign bodies: no foreign bodies  Tendon involvement: none  Nerve involvement: none  Vascular damage: no    Patient sedated: no  Irrigation solution: Peroxide.  Amount of cleaning: standard  Skin closure: Steri-Strips  Patient tolerance: Patient tolerated the procedure well with no immediate complications

## 2022-01-27 NOTE — PATIENT INSTRUCTIONS
Cough-- Wife reports that patient has Tessalon Perles at home.  Please take as directed for cough.    Make sure you are getting rest and drinking lots of fluids.    You can use cough drops or Cepacol to soothe your sore throat.     You can also take Elderberry and/or Emergen-C (vitamin C) to help boost your immune system.    Skin avulsion-- Keep your dressing on for48 hours. Do not wet Steri-Strips.  - After 48 hours remove the dressing and gently clean wound with soap and water at least twice daily unless more frequently soiled, then clean more frequently.    - You may apply topical antibiotic ointment to wound to promote healing.   - Clean old antibiotic ointment away before new application.    - Signs of infection include but not limited to:  Increase in redness, increase in pain, purulent (pus) drainage. Contact clinic or ER if infection concerns arise.   - Keep covered when you are out and about, if the dressing becomes wet, change it immediately. Keep open to air when at home.      - You must understand that you have received an Urgent Care treatment only and that you may be released before all of your medical problems are known or treated.   - You, the patient, will arrange for follow up care as instructed with your primary care provider or recommended specialist.   - If your condition worsens or fails to improve we recommend that you receive another evaluation at the ER immediately or contact your PCP to discuss your concerns, or return here.   - Please do not drive or make any important decisions for 24 hours if you have received any pain medications, sedatives or mood altering drugs during your visit.    Disclaimer: This document was drafted with the use of a voice recognition device and is likely to have sound alike errors.

## 2022-02-05 ENCOUNTER — OFFICE VISIT (OUTPATIENT)
Dept: URGENT CARE | Facility: CLINIC | Age: 87
End: 2022-02-05
Payer: MEDICARE

## 2022-02-05 VITALS
HEIGHT: 69 IN | BODY MASS INDEX: 32.58 KG/M2 | RESPIRATION RATE: 16 BRPM | TEMPERATURE: 99 F | DIASTOLIC BLOOD PRESSURE: 76 MMHG | WEIGHT: 220 LBS | SYSTOLIC BLOOD PRESSURE: 114 MMHG | HEART RATE: 90 BPM | OXYGEN SATURATION: 100 %

## 2022-02-05 DIAGNOSIS — R25.1 OCCASIONAL TREMORS: Primary | ICD-10-CM

## 2022-02-05 DIAGNOSIS — R73.9 ELEVATED BLOOD SUGAR LEVEL: ICD-10-CM

## 2022-02-05 LAB — GLUCOSE SERPL-MCNC: 143 MG/DL (ref 70–110)

## 2022-02-05 PROCEDURE — 82962 POCT GLUCOSE, HAND-HELD DEVICE: ICD-10-PCS | Mod: S$GLB,,, | Performed by: PHYSICIAN ASSISTANT

## 2022-02-05 PROCEDURE — 99214 PR OFFICE/OUTPT VISIT, EST, LEVL IV, 30-39 MIN: ICD-10-PCS | Mod: S$GLB,,, | Performed by: PHYSICIAN ASSISTANT

## 2022-02-05 PROCEDURE — 99214 OFFICE O/P EST MOD 30 MIN: CPT | Mod: S$GLB,,, | Performed by: PHYSICIAN ASSISTANT

## 2022-02-05 PROCEDURE — 82962 GLUCOSE BLOOD TEST: CPT | Mod: S$GLB,,, | Performed by: PHYSICIAN ASSISTANT

## 2022-02-05 NOTE — PROGRESS NOTES
"Subjective:       Patient ID: Yunier Aguillon is a 89 y.o. male.    Vitals:  height is 5' 9" (1.753 m) and weight is 99.8 kg (220 lb). His temperature is 98.5 °F (36.9 °C). His blood pressure is 114/76 and his pulse is 90. His respiration is 16 and oxygen saturation is 100%.     Chief Complaint: Tremors    89-year-old male presents to urgent care with wife complaining of occasional tremors.  Reports that this is the 1st time it happened starting yesterday.  Patient reports that it occurs randomly where he cannot control his body movements.  Reports that during these episodes he is unable to hold anything in his hand or control movements of arms and legs.  Denies any new medications.  Denies any chest pain or shortness of breath.  States that random tremors or worse from lying down.  Spontaneously resolves.    Tried calling PCP but they are closed.     Describes as tight feeling but not painful.   States numb left fingertips.     Arm Pain   The incident occurred 12 to 24 hours ago. The incident occurred at home. There was no injury mechanism. The quality of the pain is described as cramping. The pain is at a severity of 4/10. The patient is experiencing no pain. The pain has been intermittent since the incident. Associated symptoms include muscle weakness and numbness. He has tried nothing for the symptoms. The treatment provided no relief.       Neurological: Positive for passing out, coordination disturbances, numbness, tingling and tremors. Negative for dizziness, history of vertigo, light-headedness, facial drooping, speech difficulty, loss of balance, headaches, history of migraines, disorientation, altered mental status, loss of consciousness and seizures.   Psychiatric/Behavioral: Negative for altered mental status and disorientation.       Objective:       Vitals:    02/05/22 1116   BP: 114/76   Pulse: 90   Resp: 16   Temp: 98.5 °F (36.9 °C)   SpO2: 100%   Weight: 99.8 kg (220 lb)   Height: 5' 9" (1.753 m) "       Physical Exam   Constitutional: He is oriented to person, place, and time. He appears well-developed and well-nourished.  Non-toxic appearance. He does not appear ill. No distress.   HENT:   Head: Normocephalic and atraumatic.   Ears:   Right Ear: Hearing, tympanic membrane, external ear and ear canal normal.   Left Ear: Hearing, tympanic membrane, external ear and ear canal normal.   Nose: Nose normal. No mucosal edema, rhinorrhea or nasal deformity. No epistaxis. Right sinus exhibits no maxillary sinus tenderness and no frontal sinus tenderness. Left sinus exhibits no maxillary sinus tenderness and no frontal sinus tenderness.   Mouth/Throat: Uvula is midline, oropharynx is clear and moist and mucous membranes are normal. No trismus in the jaw. Normal dentition. No uvula swelling. No posterior oropharyngeal erythema.   Eyes: Conjunctivae, EOM and lids are normal. Pupils are equal, round, and reactive to light. No visual field deficit is present. No scleral icterus.   Neck: Trachea normal and phonation normal. Neck supple. No neck rigidity present.   Cardiovascular: Normal rate, regular rhythm, normal heart sounds, intact distal pulses and normal pulses.   Pulmonary/Chest: Effort normal and breath sounds normal. No respiratory distress.   Abdominal: Normal appearance and bowel sounds are normal. He exhibits no distension. Soft. There is no abdominal tenderness.   Musculoskeletal: Normal range of motion.         General: No deformity or edema. Normal range of motion.   Neurological: no focal deficit. He is alert, oriented to person, place, and time and at baseline. He has normal motor skills, normal sensation and intact cranial nerves. He displays no weakness, no atrophy, no tremor, facial symmetry and no dysarthria. No cranial nerve deficit or sensory deficit. He exhibits normal muscle tone. He has an abnormal Finger-Nose-Finger Test. Coordination: Romberg sign negative, Heel to shin test normal and Rapid  alternating movements normal. He shows no pronator drift. He displays no seizure activity. Coordination normal. GCS eye subscore is 4. GCS verbal subscore is 5. GCS motor subscore is 6.      Comments: Ambulates with the assistance of rolling walker.    Skin: Skin is warm, dry, intact, not diaphoretic and not pale.   Psychiatric: He has a normal mood and affect. His speech is normal and behavior is normal. Judgment and thought content normal. Cognition and memory  Nursing note and vitals reviewed.        Assessment:       1. Occasional tremors    2. Elevated blood sugar level        Results for orders placed or performed in visit on 02/05/22   POCT Glucose, Hand-Held Device   Result Value Ref Range    POC Glucose 143 (A) 70 - 110 MG/DL         Plan:         Occasional tremors  -     POCT Glucose, Hand-Held Device  -     Cancel: POCT Urinalysis, Dipstick, Automated, W/O Scope  -     Ambulatory referral/consult to Neurology    Elevated blood sugar level           Medical Decision Making:   Differential Diagnosis:   Anxiety tremors  Essential benign resting tremors  Glucose imbalance  Electrolyte imbalance  Davidson's disease  Parkinson's disease  Tourette syndrome  Myasthenia gravis  Medication reaction  Urgent Care Management:  Patient unable to provide a urine sample today.         Patient Instructions   Neuro referral ordered--Someone will call with an appointment this week.  Please answer all unknown calls.  If you are not contacted within 7 days, Please call clinic  for status of appointment.  [phone]        - You must understand that you have received an Urgent Care treatment only and that you may be released before all of your medical problems are known or treated.   - You, the patient, will arrange for follow up care as instructed with your primary care provider or recommended specialist.   - If your condition worsens or fails to improve we recommend that you receive another evaluation at the ER  "immediately or contact your PCP to discuss your concerns, or return here.   - Please do not drive or make any important decisions for 24 hours if you have received any pain medications, sedatives or mood altering drugs during your visit.    Disclaimer: This document was drafted with the use of a voice recognition device and is likely to have sound alike errors.   Patient Education       Tremor   The Basics   Written by the doctors and editors at [facility]   What is tremor? -- Tremor is the medical term for trembling or shaking. A person with tremor has a body part that shakes, and the person cannot control the shaking. Most often this shaking affects the hands or the head, but other body parts can be affected, too. The tremor can be a problem on its own, or it can be caused by another health problem.  There are several different types of tremor. They fall into several main groups:  · Rest tremors - Rest tremors happen while you are sitting or lying down and relaxed. People who have a rest tremor can usually stop the tremor by making a point of moving the part of their body that shakes.  · Action tremors - Action tremors happen when you are moving your muscles on purpose. There are a few different kinds of action tremors, including:  ? Kinetic tremors - These happen when you move on purpose, such as writing or drinking from a cup. Sometimes, the tremor gets worse gradually as you get closer to what you trying to do or reach. This is called "intention" tremor.  ? Postural tremors - These happen when you try to hold a body part still in a position other than its resting position. For example, your legs might shake when you are standing up, or your arms might shake if you hold them out in front of you.   ? Isometric tremors - These happen when you move a muscle against something that is still. For example, they might happen when you push against a wall or make a fist with your hand.  · Functional tremor - Functional " "tremor can combine features of rest and action tremors. Unlike other kinds of tremor, functional tremor has no known medical cause. This kind of tremor usually gets less severe if you are distracted while your doctor examines you, for example, if they ask you to do something else with another part of your body. Other types of tremor tend to get worse with distraction.   What are the most common causes of rest tremor? -- The most common cause of rest tremor is Parkinson disease. If that is the cause of your tremor, your doctor or nurse will probably focus on treating your Parkinson disease. This will hopefully help reduce your tremor.  Other problems that can cause rest tremors include diseases that damage parts of the brain, and a rare condition called Mikey disease, which causes copper to build up in the body.  What are the most common causes of action tremor? -- The most common cause is something called a "physiologic" tremor. Everyone, even people who are healthy, has a little bit of shaking of the hands. This is what doctors refer to as "physiologic tremor." It is normal, and you don't usually notice it, because it is very mild. But in some cases this "physiologic" or normal tremor can become exaggerated. This can happen:  · If you take certain medicines, such as those used to treat depression, or asthma and other breathing problems  · If you drink coffee, smoke cigarettes, or use "stimulants" (including caffeine and certain medicines)  · If you are anxious, excited, or afraid  · If your muscles are very tired, for example because you just worked out  · As the effects of alcohol or other drugs are wearing off  · If you have an overactive thyroid gland  · If you have a fever  If your tremor is caused by 1 of the problems listed above, the tremor should go away as soon as the problem goes away. Of course, if your tremor is caused by a medicine, you might not be able to stop taking it. But it might be possible to " "switch medicines or to lower the dose.  What is essential tremor? -- Essential tremor is a nervous system problem that causes action tremor. It is different from physiologic tremor in that it is not related to medicines, substances, or physical conditions such as fever. Essential tremor can be passed on in families.  People who have essential tremor usually shake when they try to hold their arms out straight. They also tend to shake when they move their hands with a goal in mind. For instance, their hands might shake when they try to write, drink from a glass, or touch their nose with their finger.  Essential tremor sometimes even affects the head. This makes it look as though the person is nodding their head "yes-yes" or shaking their head "no-no."  Is there a test to find out the cause of tremor? -- No, there is no test. But your doctor or nurse can learn about a lot about your tremor just by asking you questions and watching you move. Your doctor or nurse might send you for a brain scan or blood tests to make sure your tremor is not caused by something serious. But it's likely that they will be able to tell what's wrong just by doing an exam.  How is tremor treated? -- If a tremor is caused by another medical problem, treating that problem - if it can be treated - sometimes helps reduce the tremor, too. For example, people whose tremor is caused by high thyroid hormone levels often stop shaking when their hormone levels go back to normal.  Even when no other medical problems are involved, there are treatments that can help. There are a few medicines that can reduce a person's tremor. If the medicines are not effective enough and the tremor is severe, it is even possible to have a device implanted in the brain that can help control tremor.  All topics are updated as new evidence becomes available and our peer review process is complete.  This topic retrieved from [employer] on: Sep 21, 2021.  Topic 87145 Version " 7.0  Release: 29.4.2 - C29.263  © 2021 UpToDate, Inc. and/or its affiliates. All rights reserved.  Consumer Information Use and Disclaimer   This information is not specific medical advice and does not replace information you receive from your health care provider. This is only a brief summary of general information. It does NOT include all information about conditions, illnesses, injuries, tests, procedures, treatments, therapies, discharge instructions or life-style choices that may apply to you. You must talk with your health care provider for complete information about your health and treatment options. This information should not be used to decide whether or not to accept your health care provider's advice, instructions or recommendations. Only your health care provider has the knowledge and training to provide advice that is right for you. The use of this information is governed by the [employer] End User License Agreement, available at https://www.[employer]/en/solutions/"[employer]/about/al.The use of [employer] content is governed by the [employer] Terms of Use. ©2021 UpToDate, Inc. All rights reserved.  Copyright   © 2021 UpToDate, Inc. and/or its affiliates. All rights reserved.

## 2022-02-05 NOTE — PATIENT INSTRUCTIONS
Neuro referral ordered--Someone will call with an appointment this week.  Please answer all unknown calls.  If you are not contacted within 7 days, Please call clinic  for status of appointment.  [phone]        - You must understand that you have received an Urgent Care treatment only and that you may be released before all of your medical problems are known or treated.   - You, the patient, will arrange for follow up care as instructed with your primary care provider or recommended specialist.   - If your condition worsens or fails to improve we recommend that you receive another evaluation at the ER immediately or contact your PCP to discuss your concerns, or return here.   - Please do not drive or make any important decisions for 24 hours if you have received any pain medications, sedatives or mood altering drugs during your visit.    Disclaimer: This document was drafted with the use of a voice recognition device and is likely to have sound alike errors.   Patient Education       Tremor   The Basics   Written by the doctors and editors at [facility]   What is tremor? -- Tremor is the medical term for trembling or shaking. A person with tremor has a body part that shakes, and the person cannot control the shaking. Most often this shaking affects the hands or the head, but other body parts can be affected, too. The tremor can be a problem on its own, or it can be caused by another health problem.  There are several different types of tremor. They fall into several main groups:  · Rest tremors - Rest tremors happen while you are sitting or lying down and relaxed. People who have a rest tremor can usually stop the tremor by making a point of moving the part of their body that shakes.  · Action tremors - Action tremors happen when you are moving your muscles on purpose. There are a few different kinds of action tremors, including:  ? Kinetic tremors - These happen when you move on purpose, such as writing  "or drinking from a cup. Sometimes, the tremor gets worse gradually as you get closer to what you trying to do or reach. This is called "intention" tremor.  ? Postural tremors - These happen when you try to hold a body part still in a position other than its resting position. For example, your legs might shake when you are standing up, or your arms might shake if you hold them out in front of you.   ? Isometric tremors - These happen when you move a muscle against something that is still. For example, they might happen when you push against a wall or make a fist with your hand.  · Functional tremor - Functional tremor can combine features of rest and action tremors. Unlike other kinds of tremor, functional tremor has no known medical cause. This kind of tremor usually gets less severe if you are distracted while your doctor examines you, for example, if they ask you to do something else with another part of your body. Other types of tremor tend to get worse with distraction.   What are the most common causes of rest tremor? -- The most common cause of rest tremor is Parkinson disease. If that is the cause of your tremor, your doctor or nurse will probably focus on treating your Parkinson disease. This will hopefully help reduce your tremor.  Other problems that can cause rest tremors include diseases that damage parts of the brain, and a rare condition called Mikey disease, which causes copper to build up in the body.  What are the most common causes of action tremor? -- The most common cause is something called a "physiologic" tremor. Everyone, even people who are healthy, has a little bit of shaking of the hands. This is what doctors refer to as "physiologic tremor." It is normal, and you don't usually notice it, because it is very mild. But in some cases this "physiologic" or normal tremor can become exaggerated. This can happen:  · If you take certain medicines, such as those used to treat depression, or asthma " "and other breathing problems  · If you drink coffee, smoke cigarettes, or use "stimulants" (including caffeine and certain medicines)  · If you are anxious, excited, or afraid  · If your muscles are very tired, for example because you just worked out  · As the effects of alcohol or other drugs are wearing off  · If you have an overactive thyroid gland  · If you have a fever  If your tremor is caused by 1 of the problems listed above, the tremor should go away as soon as the problem goes away. Of course, if your tremor is caused by a medicine, you might not be able to stop taking it. But it might be possible to switch medicines or to lower the dose.  What is essential tremor? -- Essential tremor is a nervous system problem that causes action tremor. It is different from physiologic tremor in that it is not related to medicines, substances, or physical conditions such as fever. Essential tremor can be passed on in families.  People who have essential tremor usually shake when they try to hold their arms out straight. They also tend to shake when they move their hands with a goal in mind. For instance, their hands might shake when they try to write, drink from a glass, or touch their nose with their finger.  Essential tremor sometimes even affects the head. This makes it look as though the person is nodding their head "yes-yes" or shaking their head "no-no."  Is there a test to find out the cause of tremor? -- No, there is no test. But your doctor or nurse can learn about a lot about your tremor just by asking you questions and watching you move. Your doctor or nurse might send you for a brain scan or blood tests to make sure your tremor is not caused by something serious. But it's likely that they will be able to tell what's wrong just by doing an exam.  How is tremor treated? -- If a tremor is caused by another medical problem, treating that problem - if it can be treated - sometimes helps reduce the tremor, too. For " example, people whose tremor is caused by high thyroid hormone levels often stop shaking when their hormone levels go back to normal.  Even when no other medical problems are involved, there are treatments that can help. There are a few medicines that can reduce a person's tremor. If the medicines are not effective enough and the tremor is severe, it is even possible to have a device implanted in the brain that can help control tremor.  All topics are updated as new evidence becomes available and our peer review process is complete.  This topic retrieved from [employer] on: Sep 21, 2021.  Topic 71418 Version 7.0  Release: 29.4.2 - C29.263  © 2021 UpToDate, Inc. and/or its affiliates. All rights reserved.  Consumer Information Use and Disclaimer   This information is not specific medical advice and does not replace information you receive from your health care provider. This is only a brief summary of general information. It does NOT include all information about conditions, illnesses, injuries, tests, procedures, treatments, therapies, discharge instructions or life-style choices that may apply to you. You must talk with your health care provider for complete information about your health and treatment options. This information should not be used to decide whether or not to accept your health care provider's advice, instructions or recommendations. Only your health care provider has the knowledge and training to provide advice that is right for you. The use of this information is governed by the [employer] End User License Agreement, available at https://www.[employer]/en/solutions/[employer]/about/al.The use of [employer] content is governed by the [employer] Terms of Use. ©2021 UpToDate, Inc. All rights reserved.  Copyright   © 2021 UpToDate, Inc. and/or its affiliates. All rights reserved.

## 2022-02-08 ENCOUNTER — TELEPHONE (OUTPATIENT)
Dept: URGENT CARE | Facility: CLINIC | Age: 87
End: 2022-02-08
Payer: OTHER GOVERNMENT

## 2022-03-07 ENCOUNTER — HOSPITAL ENCOUNTER (OUTPATIENT)
Dept: RADIOLOGY | Facility: CLINIC | Age: 87
Discharge: HOME | End: 2022-03-07
Attending: NURSE PRACTITIONER

## 2022-03-07 ENCOUNTER — OFFICE VISIT (OUTPATIENT)
Dept: URGENT CARE | Facility: CLINIC | Age: 87
End: 2022-03-07
Payer: MEDICARE

## 2022-03-07 ENCOUNTER — HOSPITAL ENCOUNTER (OUTPATIENT)
Dept: RADIOLOGY | Facility: CLINIC | Age: 87
Discharge: HOME | End: 2022-03-07
Attending: NURSE PRACTITIONER
Payer: MEDICARE

## 2022-03-07 VITALS
BODY MASS INDEX: 29.68 KG/M2 | RESPIRATION RATE: 16 BRPM | OXYGEN SATURATION: 98 % | TEMPERATURE: 98 F | HEIGHT: 71 IN | HEART RATE: 73 BPM | DIASTOLIC BLOOD PRESSURE: 68 MMHG | SYSTOLIC BLOOD PRESSURE: 127 MMHG | WEIGHT: 212 LBS

## 2022-03-07 DIAGNOSIS — R07.81 RIB PAIN ON RIGHT SIDE: ICD-10-CM

## 2022-03-07 DIAGNOSIS — M25.562 ACUTE BILATERAL KNEE PAIN: ICD-10-CM

## 2022-03-07 DIAGNOSIS — S80.211A ABRASION OF RIGHT KNEE, INITIAL ENCOUNTER: ICD-10-CM
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
## 2022-03-07 DIAGNOSIS — M25.561 ACUTE BILATERAL KNEE PAIN: ICD-10-CM

## 2022-03-07 PROCEDURE — 71101 X-RAY EXAM UNILAT RIBS/CHEST: CPT | Mod: RT,S$GLB,, | Performed by: RADIOLOGY

## 2022-03-07 PROCEDURE — 99212 PR OFFICE/OUTPT VISIT, EST, LEVL II, 10-19 MIN: ICD-10-PCS | Mod: S$GLB,,, | Performed by: NURSE PRACTITIONER

## 2022-03-07 PROCEDURE — 71101 XR RIBS MIN 3 VIEWS W/ PA CHEST RIGHT: ICD-10-PCS | Mod: RT,S$GLB,, | Performed by: RADIOLOGY

## 2022-03-07 PROCEDURE — 73562 XR KNEE 3 VIEW BILATERAL: ICD-10-PCS | Mod: 50,S$GLB,, | Performed by: RADIOLOGY

## 2022-03-07 PROCEDURE — 73562 X-RAY EXAM OF KNEE 3: CPT | Mod: 50,S$GLB,, | Performed by: RADIOLOGY

## 2022-03-07 PROCEDURE — 99212 OFFICE O/P EST SF 10 MIN: CPT | Mod: S$GLB,,, | Performed by: NURSE PRACTITIONER

## 2022-03-07 RX ORDER — MUPIROCIN 20 MG/G
OINTMENT TOPICAL 3 TIMES DAILY
Qty: 22 G | Refills: 0 | Status: SHIPPED | OUTPATIENT
Start: 2022-03-07 | End: 2022-03-14

## 2022-03-08 NOTE — PROGRESS NOTES
"Subjective:       Patient ID: Yunier Aguillon is a 89 y.o. male.    Vitals:  height is 5' 11" (1.803 m) and weight is 96.2 kg (212 lb). His temperature is 97.9 °F (36.6 °C). His blood pressure is 127/68 and his pulse is 73. His respiration is 16 and oxygen saturation is 98%.     Chief Complaint: Fall    C/o fall in parking lot while getting out of car. C/o right side anterior rib pain, numbness in few right fingers. No LOC, no blood loss.    Fall  The accident occurred 3 to 6 hours ago (around 2:30). Fall occurred: getting out the car in the parking lot. He landed on concrete. The point of impact was the right knee. The pain is at a severity of 0/10 (worse when tries to stand). The patient is experiencing no pain. The symptoms are aggravated by movement, standing and sitting. Associated symptoms include numbness (in fingers). Pertinent negatives include no abdominal pain, bowel incontinence, fever, headaches, hearing loss, hematuria, loss of consciousness, nausea, tingling, visual change or vomiting. He has tried nothing for the symptoms. The treatment provided no relief.       Constitution: Negative for fever.   Gastrointestinal: Negative for abdominal pain, nausea, vomiting and bowel incontinence.   Genitourinary: Negative for hematuria.   Neurological: Positive for numbness (in fingers). Negative for headaches and loss of consciousness.       Objective:      Physical Exam   Constitutional: He is oriented to person, place, and time. He appears well-developed. He is cooperative.  Non-toxic appearance. He does not appear ill. No distress.   HENT:   Head: Normocephalic and atraumatic. Head is without abrasion, without contusion and without laceration.   Ears:   Right Ear: Tympanic membrane, external ear and ear canal normal. No hemotympanum.   Left Ear: Tympanic membrane, external ear and ear canal normal. No hemotympanum.   Nose: Nose normal. No mucosal edema, rhinorrhea or nasal deformity. No epistaxis. Right sinus " exhibits no maxillary sinus tenderness and no frontal sinus tenderness. Left sinus exhibits no maxillary sinus tenderness and no frontal sinus tenderness.   Mouth/Throat: Uvula is midline, oropharynx is clear and moist and mucous membranes are normal. No trismus in the jaw. Normal dentition. No uvula swelling. No posterior oropharyngeal erythema.   Eyes: Conjunctivae, EOM and lids are normal. Pupils are equal, round, and reactive to light. Right eye exhibits no discharge. Left eye exhibits no discharge. No scleral icterus.   Neck: Trachea normal and phonation normal. Neck supple. No tracheal deviation present. No neck rigidity present. No spinous process tenderness present. No muscular tenderness present.   Cardiovascular: Normal rate, regular rhythm and normal pulses.   Murmur heard.   Systolic murmur is present.  Pulmonary/Chest: Effort normal and breath sounds normal. No respiratory distress. He has no decreased breath sounds. He has no wheezes. He has no rhonchi.   Facial grimacing with inspiration    Comments: Facial grimacing with inspiration    Abdominal: Normal appearance and bowel sounds are normal. He exhibits no distension, no pulsatile midline mass and no mass. Soft. There is no abdominal tenderness.   Musculoskeletal: Normal range of motion.         General: No deformity. Normal range of motion.   Neurological: He is alert and oriented to person, place, and time. He has normal strength. No cranial nerve deficit or sensory deficit. He exhibits normal muscle tone. He displays no seizure activity. Coordination normal. GCS eye subscore is 4. GCS verbal subscore is 5. GCS motor subscore is 6.      Comments: Ambulates with rolling walker   Skin: Skin is warm, dry, intact, not diaphoretic and not pale. Capillary refill takes less than 2 seconds. No abrasion, No burn, No bruising and No ecchymosis        Psychiatric: His speech is normal and behavior is normal. Judgment and thought content normal.   Nursing  note and vitals reviewed.        Assessment:       1. Fall, initial encounter    2. Rib pain on right side    3. Acute bilateral knee pain    4. Abrasion of right knee, initial encounter          Plan:         Fall, initial encounter  -     XR RIB RIGHT W/ PA CHEST; Future; Expected date: 03/07/2022  -     XR KNEE 3 VIEW BILATERAL; Future; Expected date: 03/07/2022    Rib pain on right side  -     XR RIB RIGHT W/ PA CHEST; Future; Expected date: 03/07/2022    Acute bilateral knee pain  -     XR KNEE 3 VIEW BILATERAL; Future; Expected date: 03/07/2022    Abrasion of right knee, initial encounter  -     mupirocin (BACTROBAN) 2 % ointment; Apply topically 3 (three) times daily. for 7 days  Dispense: 22 g; Refill: 0                  XR KNEE 3 VIEW BILATERAL    Result Date: 3/7/2022  EXAMINATION: XR KNEE 3 VIEW BILATERAL CLINICAL HISTORY: Unspecified fall, initial encounter TECHNIQUE: AP, lateral, and Merchant views of both knees were performed. COMPARISON: None FINDINGS: No acute fracture or dislocation is noted of the right or left knee.  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  Clips are noted in the medial aspect of the right knee soft tissues.     Osteoarthritic changes without evidence of acute fracture or dislocation. Electronically signed by: Renny Enciso Date:    03/07/2022 Time:    19:02    XR RIB RIGHT W/ PA CHEST    Result Date: 3/7/2022  EXAMINATION: XR RIBS MIN 3 VIEWS W/ PA CHEST RIGHT CLINICAL HISTORY: Unspecified fall, initial encounter TECHNIQUE: Frontal view of the chest and three views of the right ribs COMPARISON: 10/26/2021 FINDINGS: No evidence of acute fracture or malalignment is noted of the right ribs.  Post sternotomy changes are present.  The aorta is calcified.  No confluent infiltrate, effusion or pneumothorax.     No acute fracture or malalignment of the right ribs. Electronically signed by: Renny Enciso  Date:    03/07/2022 Time:    19:01    Xray reviewed. Pt notified.    Take tylenol as needed for pain.  Apply mupirocin as directed on label.  Apply ice to affected area 3 to 4 times daily.    What care is needed at home?   · Ask your doctor what you need to do when you go home. Make sure you ask questions if you do not understand what the doctor says.  · Take 10 to 15 slow deep breaths at least 4 times each day. This will lower your chances of getting a lung infection. Use your incentive spirometer as you were told if you were given one. An incentive spirometer is a tool that measures how deeply you can breathe in.  · Hold a pillow to your chest when you take deep breaths, sneeze, cough, or laugh. This will help ease the pain in your ribs.  · It may hurt less to sleep in a reclined position. You can also sleep with your head and shoulders propped up on pillows.  · You may want to take medicines like ibuprofen or naproxen for swelling and pain. These are nonsteroidal anti-inflammatory drugs (NSAIDS).  · Ice may help you ease pain and swelling.  · Place an ice pack or a bag of frozen vegetables wrapped in a towel over the painful part. Never put ice right on the skin. Do not leave the ice on more than 10 to 15 minutes at a time. Use for the first 24 to 48 hours after an injury.  · If you smoke, try to quit. Broken bones take longer to heal if you smoke.  What follow-up care is needed?   Your doctor may ask you to make visits to the office to check on your progress. Be sure to keep these visits.  What drugs may be needed?   The doctor may order drugs to:  · Help with pain and swelling  Will physical activity be limited?   It may take up to 6 to 8 weeks for your rib to heal. You should not do physical activity that makes your rib hurt more. If you run, work out, or play sports, you may not be able to do those things until your health problem gets better.  What problems could happen?   Chest infection, like pneumonia,  due to not being able to take deep breaths.  What can be done to prevent this health problem?   · Wear protective gear when playing contact sports.  · Decrease your chance of falling. Move anything that may cause you to trip, clean up spills right away, and always have good lighting. Use railings on stairs.  · Always wear a seat belt. Drive safely. Obey speed limits. Do not drink and drive.  When do I need to call the doctor?   · It is getting harder and harder to breathe.  · You are so short of breath you cannot talk in a full sentence.  · You develop severe pain in your chest, back, or neck.  · You start to cough up blood or yellow or green mucus.  · You have a fever of 100.4°F (38°C) or higher or chills.  · You are very weak or light-headed or feel like you might pass out.  · You have very bad pain that is not helped by your medicines.  Teach Back: Helping You Understand   The Teach Back Method helps you understand the information we are giving you. After you talk with the staff, tell them in your own words what you learned. This helps to make sure the staff has described each thing clearly. It also helps to explain things that may have been confusing. Before going home, make sure you can do these:  · I can tell you about my pain.  · I can tell you what may help ease my pain.  · I can tell you when I can go back to my normal activities.

## 2022-06-06 ENCOUNTER — OFFICE VISIT (OUTPATIENT)
Dept: URGENT CARE | Facility: CLINIC | Age: 87
End: 2022-06-06
Payer: OTHER GOVERNMENT

## 2022-06-06 VITALS
HEART RATE: 86 BPM | OXYGEN SATURATION: 95 % | TEMPERATURE: 99 F | DIASTOLIC BLOOD PRESSURE: 65 MMHG | BODY MASS INDEX: 29.68 KG/M2 | SYSTOLIC BLOOD PRESSURE: 100 MMHG | RESPIRATION RATE: 16 BRPM | WEIGHT: 212 LBS | HEIGHT: 71 IN

## 2022-06-06 DIAGNOSIS — U07.1 COVID-19 VIRUS DETECTED: ICD-10-CM

## 2022-06-06 DIAGNOSIS — R05.9 COUGH: ICD-10-CM

## 2022-06-06 DIAGNOSIS — R53.83 FATIGUE, UNSPECIFIED TYPE: ICD-10-CM

## 2022-06-06 DIAGNOSIS — U07.1 COVID-19: Primary | ICD-10-CM

## 2022-06-06 LAB
CTP QC/QA: YES
SARS-COV-2 RDRP RESP QL NAA+PROBE: POSITIVE

## 2022-06-06 PROCEDURE — 99214 PR OFFICE/OUTPT VISIT, EST, LEVL IV, 30-39 MIN: ICD-10-PCS | Mod: S$GLB,,, | Performed by: NURSE PRACTITIONER

## 2022-06-06 PROCEDURE — 99214 OFFICE O/P EST MOD 30 MIN: CPT | Mod: S$GLB,,, | Performed by: NURSE PRACTITIONER

## 2022-06-06 PROCEDURE — U0002 COVID-19 LAB TEST NON-CDC: HCPCS | Mod: QW,S$GLB,, | Performed by: NURSE PRACTITIONER

## 2022-06-06 PROCEDURE — U0002: ICD-10-PCS | Mod: QW,S$GLB,, | Performed by: NURSE PRACTITIONER

## 2022-06-06 RX ORDER — ALBUTEROL SULFATE 0.83 MG/ML
2.5 SOLUTION RESPIRATORY (INHALATION) EVERY 6 HOURS PRN
Qty: 75 ML | Refills: 0 | Status: SHIPPED | OUTPATIENT
Start: 2022-06-06 | End: 2023-01-23

## 2022-06-06 RX ORDER — BENZONATATE 200 MG/1
200 CAPSULE ORAL 3 TIMES DAILY PRN
Qty: 30 CAPSULE | Refills: 0 | Status: SHIPPED | OUTPATIENT
Start: 2022-06-06 | End: 2022-06-16

## 2022-06-06 RX ORDER — AZITHROMYCIN 250 MG/1
250 TABLET, FILM COATED ORAL DAILY
Qty: 6 TABLET | Refills: 0 | Status: SHIPPED | OUTPATIENT
Start: 2022-06-06 | End: 2022-06-11

## 2022-06-06 NOTE — PROGRESS NOTES
"Subjective:       Patient ID: Yunier Aguillon is a 89 y.o. male.    Vitals:  height is 5' 11" (1.803 m) and weight is 96.2 kg (212 lb). His tympanic temperature is 98.7 °F (37.1 °C). His blood pressure is 100/65 and his pulse is 86. His respiration is 16 and oxygen saturation is 95%.     Chief Complaint: Fatigue    Pt's daughter is present and providing history.    Fatigue  This is a new problem. The current episode started in the past 7 days (Started Friday). The problem occurs constantly. The problem has been gradually worsening. Associated symptoms include coughing, fatigue and weakness. Pertinent negatives include no abdominal pain, anorexia, arthralgias, change in bowel habit, chest pain, chills, congestion, diaphoresis, fever, headaches, joint swelling, myalgias, nausea, neck pain, numbness, rash, sore throat, swollen glands, urinary symptoms, vertigo, visual change or vomiting. Nothing aggravates the symptoms. He has tried nothing for the symptoms. The treatment provided no relief.       Constitution: Positive for fatigue. Negative for chills, sweating and fever.   HENT: Negative for congestion and sore throat.    Neck: Negative for neck pain.   Cardiovascular: Negative for chest pain.   Respiratory: Positive for cough.    Gastrointestinal: Negative for abdominal pain, nausea and vomiting.   Musculoskeletal: Negative for joint pain, joint swelling and muscle ache.   Skin: Negative for rash.   Neurological: Negative for history of vertigo, headaches and numbness.       Objective:      Physical Exam   Constitutional: He is oriented to person, place, and time. He appears well-developed. He is cooperative.  Non-toxic appearance. He does not appear ill. No distress.   HENT:   Head: Normocephalic and atraumatic.   Ears:   Right Ear: Hearing, tympanic membrane, external ear and ear canal normal.   Left Ear: Hearing, tympanic membrane, external ear and ear canal normal.   Nose: Nose normal. No mucosal edema, " rhinorrhea or nasal deformity. No epistaxis. Right sinus exhibits no maxillary sinus tenderness and no frontal sinus tenderness. Left sinus exhibits no maxillary sinus tenderness and no frontal sinus tenderness.   Mouth/Throat: Uvula is midline, oropharynx is clear and moist and mucous membranes are normal. No trismus in the jaw. Normal dentition. No uvula swelling. Cobblestoning present. No oropharyngeal exudate, posterior oropharyngeal edema or posterior oropharyngeal erythema.   Eyes: Conjunctivae and lids are normal. No scleral icterus.   Neck: Trachea normal and phonation normal. Neck supple. No edema present. No erythema present. No neck rigidity present.   Cardiovascular: Normal rate, regular rhythm, normal heart sounds and normal pulses.   No murmur heard.  Pulmonary/Chest: Effort normal and breath sounds normal. No accessory muscle usage. No respiratory distress. He has no decreased breath sounds. He has no wheezes. He has no rhonchi.   Occasional forceful productive cough heard         Comments: Occasional forceful productive cough heard    Abdominal: Normal appearance.   Musculoskeletal: Normal range of motion.         General: No deformity. Normal range of motion.   Neurological: He is alert and oriented to person, place, and time. He exhibits normal muscle tone. Coordination normal.   Skin: Skin is warm, dry, intact, not diaphoretic and not pale.   Psychiatric: His speech is normal and behavior is normal. Judgment and thought content normal.   Nursing note and vitals reviewed.        Assessment:       1. COVID-19    2. Fatigue, unspecified type    3. Cough          Plan:         COVID-19  -     NEBULIZER KIT (SUPPLIES) FOR HOME USE  -     azithromycin (Z-BERTHA) 250 MG tablet; Take 1 tablet (250 mg total) by mouth once daily. Take two tablets on day one and take one tablet daily for the next four days. for 5 days  Dispense: 6 tablet; Refill: 0    Fatigue, unspecified type    Cough  -     POCT COVID-19  Rapid Screening  -     NEBULIZER KIT (SUPPLIES) FOR HOME USE  -     albuterol (PROVENTIL) 2.5 mg /3 mL (0.083 %) nebulizer solution; Take 3 mLs (2.5 mg total) by nebulization every 6 (six) hours as needed for Wheezing or Shortness of Breath (cough). Rescue  Dispense: 75 mL; Refill: 0  -     benzonatate (TESSALON) 200 MG capsule; Take 1 capsule (200 mg total) by mouth 3 (three) times daily as needed for Cough.  Dispense: 30 capsule; Refill: 0  -     azithromycin (Z-BERTHA) 250 MG tablet; Take 1 tablet (250 mg total) by mouth once daily. Take two tablets on day one and take one tablet daily for the next four days. for 5 days  Dispense: 6 tablet; Refill: 0                 Results for orders placed or performed in visit on 06/06/22   POCT COVID-19 Rapid Screening   Result Value Ref Range    POC Rapid COVID Positive (A) Negative     Acceptable Yes      Lab result reviewed and discussed with patient.    MDM: Pt's daughter states pt has a nebulizer that she turned in to the staff at the assisted living facility he currently resides in but no one could find it. States he used her sister's nebulizer last night which helped with cough. Pt recently recovered from pneumonia. Advised pt and daughter that I would send an antibiotic for pt to take prophylactically. Also informed her that Paxlovid had multiple interactions with pt's current medications and is not appropriate for treatment.    · Remain quarantined per CDC guidelines.  · Get plenty of rest.  · Increase fluids.   · May apply warm compresses as needed for facial pain and congestion.   · Saline nasal spray to loosen nasal congestion.  · Humidifier or steamy shower as well as guaifenesin (Mucinex) may help with congestion.  · Flonase or Nasacort to reduce inflammation in the sinus cavities.  · You may take an over the counter 24 hour antihistamine such as Zyrtec or Claritin for allergy symptoms such as sneezing, itchy/watery eyes, scratchy throat, or  congestion.  · Warm salt water gargles, Cepacol throat lozenges, or Chloraseptic spray for sore throat.  · Take Tylenol or Ibuprofen as needed for sore throat, body aches, or fever.  · Take Tessalon Pearls as prescribed for cough.  · Use nebulizer treatments as prescribed for cough and congestion.  · Diet as tolerated.  · Follow up with your primary care provider if symptoms persist >10 days or sooner for any new or worsening symptoms.   · Report to the ER for any difficulty breathing, chest pains, or loss of consciousness, or any other new and concerning symptoms.

## 2022-06-06 NOTE — PATIENT INSTRUCTIONS
Remain quarantined per CDC guidelines.  Get plenty of rest.  Increase fluids.   May apply warm compresses as needed for facial pain and congestion.   Saline nasal spray to loosen nasal congestion.  Humidifier or steamy shower as well as guaifenesin (Mucinex) may help with congestion.  Flonase or Nasacort to reduce inflammation in the sinus cavities.  You may take an over the counter 24 hour antihistamine such as Zyrtec or Claritin for allergy symptoms such as sneezing, itchy/watery eyes, scratchy throat, or congestion.  Warm salt water gargles, Cepacol throat lozenges, or Chloraseptic spray for sore throat.  Take Tylenol or Ibuprofen as needed for sore throat, body aches, or fever.  Take Tessalon Pearls as prescribed for cough.  Use nebulizer treatments as prescribed for cough and congestion.  Diet as tolerated.  Follow up with your primary care provider if symptoms persist >10 days or sooner for any new or worsening symptoms.   Report to the ER for any difficulty breathing, chest pains, or loss of consciousness, or any other new and concerning symptoms.

## 2022-08-31 ENCOUNTER — OUTSIDE PLACE OF SERVICE (OUTPATIENT)
Dept: FAMILY MEDICINE | Facility: CLINIC | Age: 87
End: 2022-08-31
Payer: MEDICARE

## 2022-08-31 PROCEDURE — 99305 PR NURSING FACILITY CARE, INIT, MOD SEVERITY: ICD-10-PCS | Mod: ,,, | Performed by: FAMILY MEDICINE

## 2022-08-31 PROCEDURE — 99305 1ST NF CARE MODERATE MDM 35: CPT | Mod: ,,, | Performed by: FAMILY MEDICINE

## 2022-09-08 ENCOUNTER — TELEPHONE (OUTPATIENT)
Dept: FAMILY MEDICINE | Facility: CLINIC | Age: 87
End: 2022-09-08
Payer: OTHER GOVERNMENT

## 2022-09-08 RX ORDER — LEVOTHYROXINE SODIUM 50 UG/1
50 TABLET ORAL EVERY MORNING
Qty: 90 TABLET | Refills: 3 | Status: SHIPPED | OUTPATIENT
Start: 2022-09-08

## 2022-09-08 NOTE — TELEPHONE ENCOUNTER
----- Message from Opal Nielsen sent at 9/8/2022  8:33 AM CDT -----  Regarding: [facility]  Contact: [facility] /[phone]  Type:  Pharmacy Calling to Clarify an RX    Name of Caller: Leena   Pharmacy Name: [facility]   Prescription Name: Synthroid 50 mcg  What do they need to clarify?: the patient's history shows he takes rx is EUTHYROX 50 mcg tablets. What should they dispense?   Best Call Back Number: [facility] /[phone]  Additional Information:

## 2022-09-14 ENCOUNTER — OUTSIDE PLACE OF SERVICE (OUTPATIENT)
Dept: FAMILY MEDICINE | Facility: CLINIC | Age: 87
End: 2022-09-14
Payer: MEDICARE

## 2022-09-14 PROCEDURE — 99307 PR NURSING FAC CARE, SUBSEQ, IMPROVING: ICD-10-PCS | Mod: ,,, | Performed by: FAMILY MEDICINE

## 2022-09-14 PROCEDURE — 99307 SBSQ NF CARE SF MDM 10: CPT | Mod: ,,, | Performed by: FAMILY MEDICINE

## 2022-09-28 ENCOUNTER — OUTSIDE PLACE OF SERVICE (OUTPATIENT)
Dept: FAMILY MEDICINE | Facility: CLINIC | Age: 87
End: 2022-09-28
Payer: MEDICARE

## 2022-09-28 PROCEDURE — 99308 PR NURSING FAC CARE, SUBSEQ, MINOR COMPLIC: ICD-10-PCS | Mod: ,,, | Performed by: FAMILY MEDICINE

## 2022-09-28 PROCEDURE — 99308 SBSQ NF CARE LOW MDM 20: CPT | Mod: ,,, | Performed by: FAMILY MEDICINE

## 2022-10-05 ENCOUNTER — OUTSIDE PLACE OF SERVICE (OUTPATIENT)
Dept: FAMILY MEDICINE | Facility: CLINIC | Age: 87
End: 2022-10-05
Payer: OTHER GOVERNMENT

## 2022-10-05 PROCEDURE — 99499 NO LOS: ICD-10-PCS | Mod: ,,, | Performed by: FAMILY MEDICINE

## 2022-10-05 PROCEDURE — 99499 UNLISTED E&M SERVICE: CPT | Mod: ,,, | Performed by: FAMILY MEDICINE

## 2022-10-19 ENCOUNTER — OUTSIDE PLACE OF SERVICE (OUTPATIENT)
Dept: FAMILY MEDICINE | Facility: CLINIC | Age: 87
End: 2022-10-19
Payer: OTHER GOVERNMENT

## 2022-10-19 PROCEDURE — 99499 NO LOS: ICD-10-PCS | Mod: ,,, | Performed by: FAMILY MEDICINE

## 2022-10-19 PROCEDURE — 99499 UNLISTED E&M SERVICE: CPT | Mod: ,,, | Performed by: FAMILY MEDICINE

## 2022-11-30 ENCOUNTER — OUTSIDE PLACE OF SERVICE (OUTPATIENT)
Dept: FAMILY MEDICINE | Facility: CLINIC | Age: 87
End: 2022-11-30
Payer: OTHER GOVERNMENT

## 2022-11-30 PROCEDURE — 99499 UNLISTED E&M SERVICE: CPT | Mod: ,,, | Performed by: FAMILY MEDICINE

## 2022-11-30 PROCEDURE — 99499 NO LOS: ICD-10-PCS | Mod: ,,, | Performed by: FAMILY MEDICINE

## 2022-12-16 DIAGNOSIS — R22.43 LOCALIZED SWELLING, MASS, OR LUMP OF BOTH LOWER EXTREMITIES: Primary | ICD-10-CM

## 2022-12-28 ENCOUNTER — HOSPITAL ENCOUNTER (OUTPATIENT)
Dept: RADIOLOGY | Facility: HOSPITAL | Age: 87
Discharge: HOME | End: 2022-12-28
Attending: FAMILY MEDICINE
Payer: OTHER GOVERNMENT

## 2022-12-28 DIAGNOSIS — R22.43 LOCALIZED SWELLING, MASS, OR LUMP OF BOTH LOWER EXTREMITIES: ICD-10-CM

## 2022-12-28 PROCEDURE — 93970 EXTREMITY STUDY: CPT | Mod: TC,PO

## 2023-01-18 ENCOUNTER — OUTSIDE PLACE OF SERVICE (OUTPATIENT)
Dept: FAMILY MEDICINE | Facility: CLINIC | Age: 88
End: 2023-01-18
Payer: OTHER GOVERNMENT

## 2023-01-18 PROCEDURE — 99499 NO LOS: ICD-10-PCS | Mod: ,,, | Performed by: FAMILY MEDICINE

## 2023-01-18 PROCEDURE — 99499 UNLISTED E&M SERVICE: CPT | Mod: ,,, | Performed by: FAMILY MEDICINE

## 2023-01-22 ENCOUNTER — HOSPITAL ENCOUNTER (INPATIENT)
Facility: HOSPITAL | Age: 88
LOS: 4 days | Discharge: HOME | DRG: 308 | End: 2023-01-27
Attending: EMERGENCY MEDICINE | Admitting: STUDENT IN AN ORGANIZED HEALTH CARE EDUCATION/TRAINING PROGRAM
Payer: MEDICARE

## 2023-01-22 DIAGNOSIS — R06.02 SHORTNESS OF BREATH: ICD-10-CM

## 2023-01-22 DIAGNOSIS — R07.9 CHEST PAIN: ICD-10-CM

## 2023-01-22 DIAGNOSIS — I48.91 ATRIAL FIBRILLATION: ICD-10-CM

## 2023-01-22 DIAGNOSIS — I48.91 ATRIAL FIBRILLATION WITH RAPID VENTRICULAR RESPONSE: Primary | ICD-10-CM

## 2023-01-22 LAB
ALBUMIN SERPL BCP-MCNC: 3.2 G/DL (ref 3.5–5.2)
ALLENS TEST: ABNORMAL
ALP SERPL-CCNC: 94 U/L (ref 55–135)
ALT SERPL W/O P-5'-P-CCNC: 46 U/L (ref 10–44)
ANION GAP SERPL CALC-SCNC: 14 MMOL/L (ref 8–16)
AST SERPL-CCNC: 26 U/L (ref 10–40)
BASOPHILS # BLD AUTO: 0.04 K/UL (ref 0–0.2)
BASOPHILS NFR BLD: 0.3 % (ref 0–1.9)
BILIRUB SERPL-MCNC: 1.2 MG/DL (ref 0.1–1)
BNP SERPL-MCNC: 1266 PG/ML (ref 0–99)
BUN SERPL-MCNC: 45 MG/DL (ref 10–30)
CALCIUM SERPL-MCNC: 9.3 MG/DL (ref 8.7–10.5)
CHLORIDE SERPL-SCNC: 112 MMOL/L (ref 95–110)
CO2 SERPL-SCNC: 23 MMOL/L (ref 23–29)
CREAT SERPL-MCNC: 1.4 MG/DL (ref 0.5–1.4)
DELSYS: ABNORMAL
DIFFERENTIAL METHOD: ABNORMAL
EOSINOPHIL # BLD AUTO: 0 K/UL (ref 0–0.5)
EOSINOPHIL NFR BLD: 0.1 % (ref 0–8)
ERYTHROCYTE [DISTWIDTH] IN BLOOD BY AUTOMATED COUNT: 16.7 % (ref 11.5–14.5)
EST. GFR  (NO RACE VARIABLE): 47 ML/MIN/1.73 M^2
FIO2: 21
GLUCOSE SERPL-MCNC: 141 MG/DL (ref 70–110)
HCO3 UR-SCNC: 21.5 MMOL/L (ref 24–28)
HCT VFR BLD AUTO: 44.4 % (ref 40–54)
HGB BLD-MCNC: 14.4 G/DL (ref 14–18)
IMM GRANULOCYTES # BLD AUTO: 0.09 K/UL (ref 0–0.04)
IMM GRANULOCYTES NFR BLD AUTO: 0.6 % (ref 0–0.5)
LYMPHOCYTES # BLD AUTO: 1 K/UL (ref 1–4.8)
LYMPHOCYTES NFR BLD: 6 % (ref 18–48)
MCH RBC QN AUTO: 30.3 PG (ref 27–31)
MCHC RBC AUTO-ENTMCNC: 32.4 G/DL (ref 32–36)
MCV RBC AUTO: 93 FL (ref 82–98)
MODE: ABNORMAL
MONOCYTES # BLD AUTO: 1.2 K/UL (ref 0.3–1)
MONOCYTES NFR BLD: 7.3 % (ref 4–15)
NEUTROPHILS # BLD AUTO: 13.7 K/UL (ref 1.8–7.7)
NEUTROPHILS NFR BLD: 85.7 % (ref 38–73)
NRBC BLD-RTO: 0 /100 WBC
PCO2 BLDA: 37.2 MMHG (ref 35–45)
PH SMN: 7.37 [PH] (ref 7.35–7.45)
PLATELET # BLD AUTO: 170 K/UL (ref 150–450)
PMV BLD AUTO: 10.2 FL (ref 9.2–12.9)
PO2 BLDA: 21 MMHG (ref 40–60)
POC BE: -4 MMOL/L
POC SATURATED O2: 35 % (ref 95–100)
POC TCO2: 23 MMOL/L (ref 24–29)
POCT GLUCOSE: 127 MG/DL (ref 70–110)
POTASSIUM SERPL-SCNC: 4 MMOL/L (ref 3.5–5.1)
PROT SERPL-MCNC: 6.2 G/DL (ref 6–8.4)
RBC # BLD AUTO: 4.76 M/UL (ref 4.6–6.2)
SAMPLE: ABNORMAL
SITE: ABNORMAL
SODIUM SERPL-SCNC: 149 MMOL/L (ref 136–145)
TROPONIN I SERPL DL<=0.01 NG/ML-MCNC: 0.1 NG/ML (ref 0–0.03)
WBC # BLD AUTO: 15.93 K/UL (ref 3.9–12.7)

## 2023-01-22 PROCEDURE — 96361 HYDRATE IV INFUSION ADD-ON: CPT

## 2023-01-22 PROCEDURE — 93010 EKG 12-LEAD: ICD-10-PCS | Mod: ,,, | Performed by: INTERNAL MEDICINE

## 2023-01-22 PROCEDURE — 99291 CRITICAL CARE FIRST HOUR: CPT | Mod: 25

## 2023-01-22 PROCEDURE — 99900035 HC TECH TIME PER 15 MIN (STAT)

## 2023-01-22 PROCEDURE — 83880 ASSAY OF NATRIURETIC PEPTIDE: CPT | Performed by: EMERGENCY MEDICINE

## 2023-01-22 PROCEDURE — 82962 GLUCOSE BLOOD TEST: CPT

## 2023-01-22 PROCEDURE — 96366 THER/PROPH/DIAG IV INF ADDON: CPT

## 2023-01-22 PROCEDURE — 96365 THER/PROPH/DIAG IV INF INIT: CPT

## 2023-01-22 PROCEDURE — 93010 ELECTROCARDIOGRAM REPORT: CPT | Mod: ,,, | Performed by: INTERNAL MEDICINE

## 2023-01-22 PROCEDURE — 93005 ELECTROCARDIOGRAM TRACING: CPT

## 2023-01-22 PROCEDURE — 96375 TX/PRO/DX INJ NEW DRUG ADDON: CPT

## 2023-01-22 PROCEDURE — 82803 BLOOD GASES ANY COMBINATION: CPT

## 2023-01-22 PROCEDURE — 96376 TX/PRO/DX INJ SAME DRUG ADON: CPT

## 2023-01-22 PROCEDURE — 25000003 PHARM REV CODE 250: Performed by: EMERGENCY MEDICINE

## 2023-01-22 PROCEDURE — 84484 ASSAY OF TROPONIN QUANT: CPT | Performed by: EMERGENCY MEDICINE

## 2023-01-22 PROCEDURE — 85025 COMPLETE CBC W/AUTO DIFF WBC: CPT | Performed by: EMERGENCY MEDICINE

## 2023-01-22 PROCEDURE — 80053 COMPREHEN METABOLIC PANEL: CPT | Performed by: EMERGENCY MEDICINE

## 2023-01-22 RX ORDER — DILTIAZEM HYDROCHLORIDE 5 MG/ML
20 INJECTION INTRAVENOUS
Status: COMPLETED | OUTPATIENT
Start: 2023-01-22 | End: 2023-01-22

## 2023-01-22 RX ORDER — DILTIAZEM HYDROCHLORIDE 5 MG/ML
10 INJECTION INTRAVENOUS
Status: COMPLETED | OUTPATIENT
Start: 2023-01-22 | End: 2023-01-22

## 2023-01-22 RX ORDER — DILTIAZEM HCL 1 MG/ML
5 INJECTION, SOLUTION INTRAVENOUS
Status: COMPLETED | OUTPATIENT
Start: 2023-01-22 | End: 2023-01-23

## 2023-01-22 RX ADMIN — DILTIAZEM HYDROCHLORIDE 10 MG: 5 INJECTION INTRAVENOUS at 08:01

## 2023-01-22 RX ADMIN — DILTIAZEM HYDROCHLORIDE 5 MG/HR: 5 INJECTION INTRAVENOUS at 11:01

## 2023-01-22 RX ADMIN — DILTIAZEM HYDROCHLORIDE 10 MG: 5 INJECTION INTRAVENOUS at 10:01

## 2023-01-22 RX ADMIN — SODIUM CHLORIDE 1000 ML: 0.9 INJECTION, SOLUTION INTRAVENOUS at 08:01

## 2023-01-22 RX ADMIN — DILTIAZEM HYDROCHLORIDE 10 MG: 5 INJECTION INTRAVENOUS at 11:01

## 2023-01-22 NOTE — Clinical Note
Diagnosis: Shortness of breath [786.05.ICD-9-CM]   Admitting Provider:: JULIETET SIMON [[id number]]   Future Attending Provider: THELMA HARDIN [[id number]]   Reason for IP Medical Treatment  (Clinical interventions that can only be accomplished in the IP setting? ) :: IV drip for Afib with RVR   Estimated Length of Stay:: 2 midnights   I certify that Inpatient services for greater than or equal to 2 midnights are medically necessary:: Yes   Plans for Post-Acute care--if anticipated (pick the single best option):: D. Skilled Nursing Placement

## 2023-01-23 PROBLEM — I48.91 ATRIAL FIBRILLATION WITH RVR: Chronic | Status: ACTIVE | Noted: 2018-12-17

## 2023-01-23 PROBLEM — E87.0 HYPERNATREMIA: Status: ACTIVE | Noted: 2023-01-23

## 2023-01-23 PROBLEM — I63.81 LACUNAR STROKE: Status: ACTIVE | Noted: 2023-01-23

## 2023-01-23 LAB
ANION GAP SERPL CALC-SCNC: 10 MMOL/L (ref 8–16)
ANION GAP SERPL CALC-SCNC: 12 MMOL/L (ref 8–16)
ANION GAP SERPL CALC-SCNC: 15 MMOL/L (ref 8–16)
AV INDEX (PROSTH): 0.23
AV MEAN GRADIENT: 9 MMHG
AV PEAK GRADIENT: 14 MMHG
AV VALVE AREA: 0.73 CM2
AV VELOCITY RATIO: 0.23
BACTERIA #/AREA URNS HPF: NORMAL /HPF
BASOPHILS # BLD AUTO: 0.03 K/UL (ref 0–0.2)
BASOPHILS NFR BLD: 0.2 % (ref 0–1.9)
BILIRUB UR QL STRIP: NEGATIVE
BSA FOR ECHO PROCEDURE: 2.01 M2
BUN SERPL-MCNC: 30 MG/DL (ref 10–30)
BUN SERPL-MCNC: 34 MG/DL (ref 10–30)
BUN SERPL-MCNC: 37 MG/DL (ref 10–30)
CALCIUM SERPL-MCNC: 7.6 MG/DL (ref 8.7–10.5)
CALCIUM SERPL-MCNC: 8.3 MG/DL (ref 8.7–10.5)
CALCIUM SERPL-MCNC: 8.6 MG/DL (ref 8.7–10.5)
CHLORIDE SERPL-SCNC: 104 MMOL/L (ref 95–110)
CHLORIDE SERPL-SCNC: 116 MMOL/L (ref 95–110)
CHLORIDE SERPL-SCNC: 117 MMOL/L (ref 95–110)
CLARITY UR: CLEAR
CO2 SERPL-SCNC: 21 MMOL/L (ref 23–29)
CO2 SERPL-SCNC: 21 MMOL/L (ref 23–29)
CO2 SERPL-SCNC: 22 MMOL/L (ref 23–29)
COLOR UR: YELLOW
CREAT SERPL-MCNC: 1 MG/DL (ref 0.5–1.4)
CREAT SERPL-MCNC: 1.1 MG/DL (ref 0.5–1.4)
CREAT SERPL-MCNC: 1.1 MG/DL (ref 0.5–1.4)
CV ECHO LV RWT: 0.41 CM
DIFFERENTIAL METHOD: ABNORMAL
DOP CALC AO PEAK VEL: 1.87 M/S
DOP CALC AO VTI: 30.2 CM
DOP CALC LVOT AREA: 3.2 CM2
DOP CALC LVOT DIAMETER: 2.03 CM
DOP CALC LVOT PEAK VEL: 0.43 M/S
DOP CALC LVOT STROKE VOLUME: 22 CM3
DOP CALCLVOT PEAK VEL VTI: 6.8 CM
ECHO LV POSTERIOR WALL: 1.01 CM (ref 0.6–1.1)
EJECTION FRACTION: 20 %
EOSINOPHIL # BLD AUTO: 0 K/UL (ref 0–0.5)
EOSINOPHIL NFR BLD: 0.2 % (ref 0–8)
ERYTHROCYTE [DISTWIDTH] IN BLOOD BY AUTOMATED COUNT: 17.3 % (ref 11.5–14.5)
EST. GFR  (NO RACE VARIABLE): >60 ML/MIN/1.73 M^2
FRACTIONAL SHORTENING: 19 % (ref 28–44)
GLUCOSE SERPL-MCNC: 114 MG/DL (ref 70–110)
GLUCOSE SERPL-MCNC: 115 MG/DL (ref 70–110)
GLUCOSE SERPL-MCNC: 418 MG/DL (ref 70–110)
GLUCOSE UR QL STRIP: NEGATIVE
HCT VFR BLD AUTO: 45 % (ref 40–54)
HGB BLD-MCNC: 14.1 G/DL (ref 14–18)
HGB UR QL STRIP: NEGATIVE
HYALINE CASTS #/AREA URNS LPF: 1 /LPF
IMM GRANULOCYTES # BLD AUTO: 0.09 K/UL (ref 0–0.04)
IMM GRANULOCYTES NFR BLD AUTO: 0.6 % (ref 0–0.5)
INTERVENTRICULAR SEPTUM: 1.35 CM (ref 0.6–1.1)
IVC DIAMETER: 2.45 CM
KETONES UR QL STRIP: NEGATIVE
LA MAJOR: 8.93 CM
LA MINOR: 5.25 CM
LA WIDTH: 5.3 CM
LEFT ATRIUM SIZE: 4.62 CM
LEFT ATRIUM VOLUME INDEX MOD: 99.8 ML/M2
LEFT ATRIUM VOLUME INDEX: 68.8 ML/M2
LEFT ATRIUM VOLUME MOD: 199.68 CM3
LEFT ATRIUM VOLUME: 137.63 CM3
LEFT INTERNAL DIMENSION IN SYSTOLE: 4.04 CM (ref 2.1–4)
LEFT VENTRICLE DIASTOLIC VOLUME INDEX: 58.37 ML/M2
LEFT VENTRICLE DIASTOLIC VOLUME: 116.74 ML
LEFT VENTRICLE MASS INDEX: 113 G/M2
LEFT VENTRICLE SYSTOLIC VOLUME INDEX: 35.9 ML/M2
LEFT VENTRICLE SYSTOLIC VOLUME: 71.75 ML
LEFT VENTRICULAR INTERNAL DIMENSION IN DIASTOLE: 4.97 CM (ref 3.5–6)
LEFT VENTRICULAR MASS: 226.13 G
LEUKOCYTE ESTERASE UR QL STRIP: NEGATIVE
LVOT MG: 0.42 MMHG
LVOT MV: 0.31 CM/S
LYMPHOCYTES # BLD AUTO: 1.3 K/UL (ref 1–4.8)
LYMPHOCYTES NFR BLD: 8.9 % (ref 18–48)
MCH RBC QN AUTO: 29.6 PG (ref 27–31)
MCHC RBC AUTO-ENTMCNC: 31.3 G/DL (ref 32–36)
MCV RBC AUTO: 95 FL (ref 82–98)
MICROSCOPIC COMMENT: NORMAL
MONOCYTES # BLD AUTO: 1 K/UL (ref 0.3–1)
MONOCYTES NFR BLD: 6.9 % (ref 4–15)
NEUTROPHILS # BLD AUTO: 11.8 K/UL (ref 1.8–7.7)
NEUTROPHILS NFR BLD: 83.2 % (ref 38–73)
NITRITE UR QL STRIP: NEGATIVE
NRBC BLD-RTO: 0 /100 WBC
PH UR STRIP: 6 [PH] (ref 5–8)
PISA AR MAX VEL: 2.01 M/S
PISA MRMAX VEL: 5.34 M/S
PISA TR MAX VEL: 2.3 M/S
PLATELET # BLD AUTO: 139 K/UL (ref 150–450)
PMV BLD AUTO: 10.6 FL (ref 9.2–12.9)
POTASSIUM SERPL-SCNC: 3.3 MMOL/L (ref 3.5–5.1)
POTASSIUM SERPL-SCNC: 3.9 MMOL/L (ref 3.5–5.1)
POTASSIUM SERPL-SCNC: 4 MMOL/L (ref 3.5–5.1)
PROT UR QL STRIP: ABNORMAL
PULM VEIN S/D RATIO: 1.11
PV PEAK D VEL: 0.35 M/S
PV PEAK S VEL: 0.39 M/S
PV PEAK VELOCITY: 0.71 CM/S
RA MAJOR: 7.82 CM
RA PRESSURE: 15 MMHG
RA WIDTH: 5.12 CM
RBC # BLD AUTO: 4.76 M/UL (ref 4.6–6.2)
RBC #/AREA URNS HPF: 0 /HPF (ref 0–4)
RIGHT VENTRICULAR END-DIASTOLIC DIMENSION: 2.92 CM
SODIUM SERPL-SCNC: 135 MMOL/L (ref 136–145)
SODIUM SERPL-SCNC: 149 MMOL/L (ref 136–145)
SODIUM SERPL-SCNC: 154 MMOL/L (ref 136–145)
SP GR UR STRIP: 1.02 (ref 1–1.03)
SQUAMOUS #/AREA URNS HPF: 0 /HPF
TR MAX PG: 21 MMHG
TV REST PULMONARY ARTERY PRESSURE: 36 MMHG
UNIDENT CRYS URNS QL MICRO: 1
URN SPEC COLLECT METH UR: ABNORMAL
UROBILINOGEN UR STRIP-ACNC: NEGATIVE EU/DL
WBC # BLD AUTO: 14.12 K/UL (ref 3.9–12.7)
WBC #/AREA URNS HPF: 1 /HPF (ref 0–5)

## 2023-01-23 PROCEDURE — 99900035 HC TECH TIME PER 15 MIN (STAT)

## 2023-01-23 PROCEDURE — 25000003 PHARM REV CODE 250: Performed by: NURSE PRACTITIONER

## 2023-01-23 PROCEDURE — 81000 URINALYSIS NONAUTO W/SCOPE: CPT | Performed by: STUDENT IN AN ORGANIZED HEALTH CARE EDUCATION/TRAINING PROGRAM

## 2023-01-23 PROCEDURE — 99214 OFFICE O/P EST MOD 30 MIN: CPT | Mod: ,,, | Performed by: NURSE PRACTITIONER

## 2023-01-23 PROCEDURE — 25000003 PHARM REV CODE 250: Performed by: STUDENT IN AN ORGANIZED HEALTH CARE EDUCATION/TRAINING PROGRAM

## 2023-01-23 PROCEDURE — 80048 BASIC METABOLIC PNL TOTAL CA: CPT | Performed by: STUDENT IN AN ORGANIZED HEALTH CARE EDUCATION/TRAINING PROGRAM

## 2023-01-23 PROCEDURE — 11000001 HC ACUTE MED/SURG PRIVATE ROOM

## 2023-01-23 PROCEDURE — 94761 N-INVAS EAR/PLS OXIMETRY MLT: CPT

## 2023-01-23 PROCEDURE — 92610 EVALUATE SWALLOWING FUNCTION: CPT

## 2023-01-23 PROCEDURE — 99214 PR OFFICE/OUTPT VISIT, EST, LEVL IV, 30-39 MIN: ICD-10-PCS | Mod: ,,, | Performed by: NURSE PRACTITIONER

## 2023-01-23 PROCEDURE — 85025 COMPLETE CBC W/AUTO DIFF WBC: CPT | Performed by: STUDENT IN AN ORGANIZED HEALTH CARE EDUCATION/TRAINING PROGRAM

## 2023-01-23 RX ORDER — HYDRALAZINE HYDROCHLORIDE 20 MG/ML
10 INJECTION INTRAMUSCULAR; INTRAVENOUS EVERY 6 HOURS PRN
Status: DISCONTINUED | OUTPATIENT
Start: 2023-01-23 | End: 2023-01-27 | Stop reason: HOSPADM

## 2023-01-23 RX ORDER — ZINC OXIDE 20 G/100G
OINTMENT TOPICAL
COMMUNITY
Start: 2022-08-26

## 2023-01-23 RX ORDER — LEVOTHYROXINE SODIUM 50 UG/1
50 TABLET ORAL
Status: DISCONTINUED | OUTPATIENT
Start: 2023-01-23 | End: 2023-01-27 | Stop reason: HOSPADM

## 2023-01-23 RX ORDER — ASPIRIN 81 MG/1
81 TABLET ORAL
COMMUNITY
End: 2023-01-23 | Stop reason: DRUGHIGH

## 2023-01-23 RX ORDER — IBUPROFEN 200 MG
16 TABLET ORAL
Status: DISCONTINUED | OUTPATIENT
Start: 2023-01-23 | End: 2023-01-27 | Stop reason: HOSPADM

## 2023-01-23 RX ORDER — LOSARTAN POTASSIUM 25 MG/1
25 TABLET ORAL DAILY
COMMUNITY
Start: 2022-12-14

## 2023-01-23 RX ORDER — ATORVASTATIN CALCIUM 40 MG/1
40 TABLET, FILM COATED ORAL
COMMUNITY
Start: 2022-04-20 | End: 2023-01-23 | Stop reason: DRUGHIGH

## 2023-01-23 RX ORDER — LORAZEPAM 0.5 MG/1
0.25 TABLET ORAL
COMMUNITY
Start: 2022-07-13 | End: 2023-01-23 | Stop reason: DRUGHIGH

## 2023-01-23 RX ORDER — TALC
POWDER (GRAM) TOPICAL
COMMUNITY
End: 2023-01-23

## 2023-01-23 RX ORDER — TAMSULOSIN HYDROCHLORIDE 0.4 MG/1
0.4 CAPSULE ORAL DAILY
COMMUNITY
Start: 2022-12-14

## 2023-01-23 RX ORDER — LOSARTAN POTASSIUM 50 MG/1
1 TABLET ORAL DAILY
COMMUNITY
Start: 2022-06-13 | End: 2023-01-23 | Stop reason: DRUGHIGH

## 2023-01-23 RX ORDER — SERTRALINE HYDROCHLORIDE 50 MG/1
50 TABLET, FILM COATED ORAL DAILY
COMMUNITY
Start: 2022-11-04

## 2023-01-23 RX ORDER — NALOXONE HCL 0.4 MG/ML
0.02 VIAL (ML) INJECTION
Status: DISCONTINUED | OUTPATIENT
Start: 2023-01-23 | End: 2023-01-27 | Stop reason: HOSPADM

## 2023-01-23 RX ORDER — CLONAZEPAM 0.5 MG/1
1 TABLET ORAL NIGHTLY PRN
COMMUNITY
Start: 2022-06-28 | End: 2023-01-23

## 2023-01-23 RX ORDER — IPRATROPIUM BROMIDE AND ALBUTEROL SULFATE 2.5; .5 MG/3ML; MG/3ML
3 SOLUTION RESPIRATORY (INHALATION) EVERY 4 HOURS PRN
Status: DISCONTINUED | OUTPATIENT
Start: 2023-01-23 | End: 2023-01-27 | Stop reason: HOSPADM

## 2023-01-23 RX ORDER — LOSARTAN POTASSIUM 50 MG/1
50 TABLET ORAL DAILY
Status: DISCONTINUED | OUTPATIENT
Start: 2023-01-23 | End: 2023-01-25

## 2023-01-23 RX ORDER — DONEPEZIL HYDROCHLORIDE 10 MG/1
10 TABLET, FILM COATED ORAL DAILY
Status: ON HOLD | COMMUNITY
Start: 2022-10-21 | End: 2023-01-26 | Stop reason: HOSPADM

## 2023-01-23 RX ORDER — OLANZAPINE 5 MG/1
5 TABLET, ORALLY DISINTEGRATING ORAL
COMMUNITY
End: 2023-01-23

## 2023-01-23 RX ORDER — POLYETHYLENE GLYCOL 3350 17 G/17G
17 POWDER, FOR SOLUTION ORAL DAILY
COMMUNITY
Start: 2022-08-26

## 2023-01-23 RX ORDER — ESCITALOPRAM OXALATE 10 MG/1
1 TABLET ORAL DAILY
COMMUNITY
Start: 2022-06-28 | End: 2023-01-23

## 2023-01-23 RX ORDER — CHOLECALCIFEROL (VITAMIN D3) 50 MCG
4000 TABLET ORAL DAILY
COMMUNITY
Start: 2022-06-07

## 2023-01-23 RX ORDER — DILTIAZEM HCL/D5W 125 MG/125
5 PLASTIC BAG, INJECTION (ML) INTRAVENOUS CONTINUOUS
Status: DISCONTINUED | OUTPATIENT
Start: 2023-01-23 | End: 2023-01-24

## 2023-01-23 RX ORDER — SODIUM CHLORIDE 0.9 % (FLUSH) 0.9 %
10 SYRINGE (ML) INJECTION EVERY 12 HOURS PRN
Status: DISCONTINUED | OUTPATIENT
Start: 2023-01-23 | End: 2023-01-27 | Stop reason: HOSPADM

## 2023-01-23 RX ORDER — CLINDAMYCIN HYDROCHLORIDE 300 MG/1
CAPSULE ORAL
COMMUNITY
End: 2023-01-23

## 2023-01-23 RX ORDER — AMLODIPINE BESYLATE 5 MG/1
10 TABLET ORAL DAILY
Status: DISCONTINUED | OUTPATIENT
Start: 2023-01-23 | End: 2023-01-24

## 2023-01-23 RX ORDER — GLUCAGON 1 MG
1 KIT INJECTION
Status: DISCONTINUED | OUTPATIENT
Start: 2023-01-23 | End: 2023-01-27 | Stop reason: HOSPADM

## 2023-01-23 RX ORDER — HALOPERIDOL 5 MG/ML
INJECTION INTRAMUSCULAR
COMMUNITY
Start: 2022-11-10 | End: 2023-01-23

## 2023-01-23 RX ORDER — DONEPEZIL HYDROCHLORIDE 5 MG/1
5 TABLET, FILM COATED ORAL
COMMUNITY
End: 2023-01-23 | Stop reason: DRUGHIGH

## 2023-01-23 RX ORDER — ATORVASTATIN CALCIUM 40 MG/1
40 TABLET, FILM COATED ORAL DAILY
COMMUNITY
Start: 2022-12-14

## 2023-01-23 RX ORDER — QUETIAPINE FUMARATE 25 MG/1
25 TABLET, FILM COATED ORAL DAILY
COMMUNITY
Start: 2022-11-04

## 2023-01-23 RX ORDER — LORAZEPAM 0.5 MG/1
0.5 TABLET ORAL EVERY 6 HOURS PRN
COMMUNITY
Start: 2023-01-09

## 2023-01-23 RX ORDER — TAMSULOSIN HYDROCHLORIDE 0.4 MG/1
0.4 CAPSULE ORAL
COMMUNITY
End: 2023-01-23 | Stop reason: DRUGHIGH

## 2023-01-23 RX ORDER — DEXTROSE MONOHYDRATE 50 MG/ML
INJECTION, SOLUTION INTRAVENOUS CONTINUOUS
Status: DISCONTINUED | OUTPATIENT
Start: 2023-01-23 | End: 2023-01-23

## 2023-01-23 RX ORDER — TALC
6 POWDER (GRAM) TOPICAL NIGHTLY PRN
Status: DISCONTINUED | OUTPATIENT
Start: 2023-01-23 | End: 2023-01-27 | Stop reason: HOSPADM

## 2023-01-23 RX ORDER — SERTRALINE HYDROCHLORIDE 25 MG/1
TABLET, FILM COATED ORAL
COMMUNITY
Start: 2022-10-21 | End: 2023-01-23 | Stop reason: DRUGHIGH

## 2023-01-23 RX ORDER — IBUPROFEN 200 MG
24 TABLET ORAL
Status: DISCONTINUED | OUTPATIENT
Start: 2023-01-23 | End: 2023-01-27 | Stop reason: HOSPADM

## 2023-01-23 RX ORDER — METOPROLOL TARTRATE 25 MG/1
25 TABLET, FILM COATED ORAL 2 TIMES DAILY
Status: DISCONTINUED | OUTPATIENT
Start: 2023-01-23 | End: 2023-01-24

## 2023-01-23 RX ORDER — GABAPENTIN 100 MG/1
100 CAPSULE ORAL 2 TIMES DAILY PRN
COMMUNITY
Start: 2022-03-21 | End: 2023-01-23

## 2023-01-23 RX ORDER — AMLODIPINE BESYLATE 5 MG/1
TABLET ORAL
COMMUNITY
Start: 2022-09-08 | End: 2023-01-23 | Stop reason: DRUGHIGH

## 2023-01-23 RX ORDER — HYDROCORTISONE 25 MG/G
CREAM TOPICAL 2 TIMES DAILY
COMMUNITY
Start: 2022-05-03 | End: 2023-01-23

## 2023-01-23 RX ORDER — DONEPEZIL HYDROCHLORIDE 5 MG/1
5 TABLET, FILM COATED ORAL DAILY
Status: DISCONTINUED | OUTPATIENT
Start: 2023-01-23 | End: 2023-01-27 | Stop reason: HOSPADM

## 2023-01-23 RX ORDER — BISACODYL 10 MG
10 SUPPOSITORY, RECTAL RECTAL DAILY PRN
COMMUNITY

## 2023-01-23 RX ORDER — NAPROXEN SODIUM 220 MG/1
81 TABLET, FILM COATED ORAL DAILY
Status: DISCONTINUED | OUTPATIENT
Start: 2023-01-23 | End: 2023-01-27 | Stop reason: HOSPADM

## 2023-01-23 RX ORDER — DONEPEZIL HYDROCHLORIDE 10 MG/1
5 TABLET, FILM COATED ORAL
COMMUNITY
Start: 2022-06-07 | End: 2023-01-23 | Stop reason: DRUGHIGH

## 2023-01-23 RX ORDER — MENTHOL/ZINC OXIDE 0.44-20.6%
OINTMENT (GRAM) TOPICAL DAILY PRN
COMMUNITY
Start: 2022-09-16

## 2023-01-23 RX ORDER — LANOLIN ALCOHOL/MO/W.PET/CERES
400 CREAM (GRAM) TOPICAL DAILY
Status: DISCONTINUED | OUTPATIENT
Start: 2023-01-23 | End: 2023-01-27 | Stop reason: HOSPADM

## 2023-01-23 RX ORDER — POTASSIUM CHLORIDE 750 MG/1
20 TABLET, EXTENDED RELEASE ORAL
COMMUNITY
Start: 2022-06-14 | End: 2023-01-23 | Stop reason: DRUGHIGH

## 2023-01-23 RX ORDER — VALPROIC ACID 250 MG/1
250 CAPSULE, LIQUID FILLED ORAL 2 TIMES DAILY
COMMUNITY

## 2023-01-23 RX ORDER — BUSPIRONE HYDROCHLORIDE 5 MG/1
TABLET ORAL
COMMUNITY
Start: 2022-10-21 | End: 2023-01-23

## 2023-01-23 RX ORDER — FUROSEMIDE 20 MG/1
20 TABLET ORAL
COMMUNITY
End: 2023-01-23 | Stop reason: SDUPTHER

## 2023-01-23 RX ORDER — ONDANSETRON 2 MG/ML
4 INJECTION INTRAMUSCULAR; INTRAVENOUS EVERY 8 HOURS PRN
Status: DISCONTINUED | OUTPATIENT
Start: 2023-01-23 | End: 2023-01-27 | Stop reason: HOSPADM

## 2023-01-23 RX ORDER — DIVALPROEX SODIUM 250 MG/1
TABLET, DELAYED RELEASE ORAL
COMMUNITY
Start: 2022-12-20 | End: 2023-01-23 | Stop reason: DRUGHIGH

## 2023-01-23 RX ORDER — ACETAMINOPHEN 500 MG
1000 TABLET ORAL EVERY 8 HOURS PRN
Status: DISCONTINUED | OUTPATIENT
Start: 2023-01-23 | End: 2023-01-27 | Stop reason: HOSPADM

## 2023-01-23 RX ORDER — ENOXAPARIN SODIUM 100 MG/ML
40 INJECTION SUBCUTANEOUS EVERY 24 HOURS
Status: DISCONTINUED | OUTPATIENT
Start: 2023-01-23 | End: 2023-01-23

## 2023-01-23 RX ORDER — METOPROLOL TARTRATE 25 MG/1
25 TABLET, FILM COATED ORAL EVERY MORNING
Status: ON HOLD | COMMUNITY
Start: 2023-01-20 | End: 2023-01-26 | Stop reason: HOSPADM

## 2023-01-23 RX ORDER — ACETAMINOPHEN 160 MG/5ML
1 SUSPENSION, ORAL (FINAL DOSE FORM) ORAL 4 TIMES DAILY PRN
COMMUNITY
Start: 2022-08-26

## 2023-01-23 RX ORDER — CEPHALEXIN 500 MG/1
CAPSULE ORAL
COMMUNITY
Start: 2022-10-04 | End: 2023-01-23

## 2023-01-23 RX ORDER — AMLODIPINE BESYLATE 5 MG/1
5 TABLET ORAL
COMMUNITY
End: 2023-01-23 | Stop reason: DRUGHIGH

## 2023-01-23 RX ORDER — PSEUDOEPH/DM/GUAIFEN/ACETAMIN 30-10-324
EXPECTORANT ORAL
COMMUNITY
Start: 2022-08-29 | End: 2023-01-23

## 2023-01-23 RX ORDER — POTASSIUM CHLORIDE 750 MG/1
10 TABLET, EXTENDED RELEASE ORAL EVERY MORNING
COMMUNITY
Start: 2022-12-14

## 2023-01-23 RX ADMIN — DEXTROSE: 5 SOLUTION INTRAVENOUS at 05:01

## 2023-01-23 RX ADMIN — AMLODIPINE BESYLATE 10 MG: 5 TABLET ORAL at 10:01

## 2023-01-23 RX ADMIN — DONEPEZIL HYDROCHLORIDE 5 MG: 5 TABLET, FILM COATED ORAL at 10:01

## 2023-01-23 RX ADMIN — MAGNESIUM OXIDE TAB 400 MG (241.3 MG ELEMENTAL MG) 400 MG: 400 (241.3 MG) TAB at 09:01

## 2023-01-23 RX ADMIN — Medication 5 MG/HR: at 05:01

## 2023-01-23 RX ADMIN — ASPIRIN 81 MG CHEWABLE TABLET 81 MG: 81 TABLET CHEWABLE at 10:01

## 2023-01-23 RX ADMIN — LOSARTAN POTASSIUM 50 MG: 50 TABLET, FILM COATED ORAL at 10:01

## 2023-01-23 RX ADMIN — Medication 5 MG/HR: at 10:01

## 2023-01-23 RX ADMIN — SODIUM CHLORIDE 2000 ML: 0.9 INJECTION, SOLUTION INTRAVENOUS at 02:01

## 2023-01-23 RX ADMIN — APIXABAN 5 MG: 5 TABLET, FILM COATED ORAL at 10:01

## 2023-01-23 RX ADMIN — METOPROLOL TARTRATE 25 MG: 25 TABLET, FILM COATED ORAL at 10:01

## 2023-01-23 RX ADMIN — DEXTROSE: 5 SOLUTION INTRAVENOUS at 06:01

## 2023-01-23 NOTE — PT/OT/SLP EVAL
Speech Language Pathology Evaluation  Bedside Swallow    Patient Name:  Yunier Aguillon   MRN:  [id number]  Admitting Diagnosis: Atrial fibrillation with RVR    Recommendations:                 Diet recommendations:  Mechanical soft, Thin   Aspiration Precautions: upright for meals, slow rate, alternate sips and bites, crush meds, must be alert and awake when consuming PO   General Precautions: Standard, fall  Communication strategies:  maintain calm, minimize distractors, [place]    History per MD      HPI: Yunier Aguillon is 89yr old male with PMH of dementia, HLD, HTN, Afib, and BPH who presents with SOB.      Due to dementia and difficulty hearing, the pt wasn't able to give much of a story. Per EMS, pt presented with SOB that began on Sunday evening, with no associated fever, cough, or phlegm production. He was noted to be sating around 85% on RA, while in route. He was also noted to be in Afib with RVR, and he received 10mg of metoprolol. In addition, facility reports that pt has been a bit more combative then normal.      In ED, triage vitals were significant for tachycadia and tachypnea. CBC was significant for leukocytosis. CMP was significant for hypernatremia, elevated glucose, and elevated ALT. BNP was 1,266 and troponin was .095. CT head showed no acute intracranial hemorrhage, with lacunar type infarct in the left side of the qing. CXR showed no focal consolidation, although pt with course interstitial findings.      Pt was started on a dilitazem drip, with some improvement in the RVR      Medicine was consulted for admission for Afib with RVR treatment    Past Medical History:   Diagnosis Date    Hyperlipidemia     Hypertension     Hyperthyroidism        History reviewed. No pertinent surgical history.    Social History: Patient lives at [facility] home.    Prior Intubation HX:  n/a    Modified Barium Swallow: none per EMR     CT of head: No acute intracranial hemorrhage, mass effect or evidence of major arterial  infarct.     Low density in the deep white matter as seen with microvascular chronic ischemic changes.   Chest X-Rays:  There is no pleural effusion    Prior diet: unknown     Subjective     Pt seen at bedside for clinical swallow eval, pt seen in ED, no family present.   Patient goals: no comment from patient, remained distracted and became fidgety as session continued     Pain/Comfort:  Pain Rating 1: 0/10    Respiratory Status: room air     Objective:   Pt seen in ED, no family present. Pt is confused and needs reorientation, Pt is also [place]     Oral Musculature Evaluation  Oral Musculature: general weakness, unable to assess due to poor participation/comprehension  Dentition: present and adequate  Secretion Management: adequate  Mucosal Quality: adequate  Volitional Cough: elicited  Volitional Swallow: delayed oral phase, fair pharyngeal swallow  Voice Prior to PO Intake: clear voice    Bedside Swallow Eval:   Consistencies Assessed:  Thin liquids water by tsp, straw   Puree pudding bites   Soft solids diced peaches and scrambled eggs       Oral Phase:   Excess chewing  Orally expelled  Slow oral transit time    Pharyngeal Phase:   delayed swallow initation  no overt clinical signs/symptoms of pharyngeal dysphagia  multiple spontaneous swallows  No changes in  RR or SPO2 per monitor in ED      Treatment: Unable to educate pt on goals and diet recs, pt remains confused. Diet recs sent to MD and RN following session.     Assessment:     Yunier Aguillon is a 89 y.o. male admitted with Atrial fibrillation with RVR who presents with an SLP diagnosis of ability to tolerated chopped up solids (mech soft) and liquids.    Goals:   Multidisciplinary Problems       SLP Goals       Not on file                    Plan:     Patient to be seen:      Plan of Care expires:     Plan of Care reviewed with:  patient   SLP Follow-Up:  No       Discharge recommendations:  nursing facility, basic   Barriers to Discharge:  None    Time  Tracking:     SLP Treatment Date:   01/23/23  Speech Start Time:  0852  Speech Stop Time:  0910     Speech Total Time (min):  18 min    Billable Minutes: Eval Swallow and Oral Function 18    01/23/2023

## 2023-01-23 NOTE — ED NOTES
Per nursing home staff Hailey Whelan, pt is mostly non-verbal but answers questions. Pt has not answered questions after arriving to ED. Physician notified.

## 2023-01-23 NOTE — ED NOTES
Bladder scan shows 569 ml UO. In and out catheter urine output 525. Admit team notified. Incontinence care provided, mepilex applied to sacrum. Skin around sacrum appears dry and intact.

## 2023-01-23 NOTE — ED NOTES
Pt arrives via EMS from [facility] w/ c/o SOB. EMS reports RA O2 sat of 85%. Pts O2 sat 100% on RA on arrival to ED. Pt in a fib w/ RVR. Pt has hx of a fib and dementia. Per EMS, pts mental status WNL, reports pt is more combative that normal. Pt calm and cooperative at this time. Awake and alert, but not answering questions. Bruising noted to pts bilateral hands. Cardiac and continuous pulse ox monitoring in place. Will continue to monitor.

## 2023-01-23 NOTE — SUBJECTIVE & OBJECTIVE
Past Medical History:   Diagnosis Date    Hyperlipidemia     Hypertension     Hyperthyroidism        History reviewed. No pertinent surgical history.    Review of patient's allergies indicates:   Allergen Reactions    Ace inhibitors        No current facility-administered medications on file prior to encounter.     Current Outpatient Medications on File Prior to Encounter   Medication Sig    albuterol (PROVENTIL) 2.5 mg /3 mL (0.083 %) nebulizer solution Take 3 mLs (2.5 mg total) by nebulization every 6 (six) hours as needed for Wheezing or Shortness of Breath (cough). Rescue    albuterol (PROVENTIL/VENTOLIN HFA) 90 mcg/actuation inhaler albuterol sulfate HFA 90 mcg/actuation aerosol inhaler    amLODIPine (NORVASC) 10 MG tablet Take 10 mg by mouth.    apixaban (ELIQUIS) 5 mg (74 tabs) DsPk Take 5 mg by mouth.    aspirin 81 MG Chew aspirin 81 mg chewable tablet   Chew 1 tablet every day by oral route.    atorvastatin (LIPITOR) 80 MG tablet Take 80 mg by mouth.    cholecalciferol, vitamin D3, (VITAMIN D3) 50 mcg (2,000 unit) Tab Take 2,000 Units by mouth.    donepeziL (ARICEPT) 5 MG tablet donepezil 5 mg tablet    EUTHYROX 50 mcg tablet Take 1 tablet (50 mcg total) by mouth every morning.    furosemide (LASIX) 20 MG tablet Take 1 tablet (20 mg total) by mouth once daily.    ipratropium-albuteroL (COMBIVENT)  mcg/actuation inhaler Inhale 1 puff into the lungs every 6 to 8 hours as needed for Wheezing or Shortness of Breath. Rescue    losartan (COZAAR) 50 MG tablet Take 50 mg by mouth once daily.    magnesium oxide (MAG-OX) 400 mg (241.3 mg magnesium) tablet Take 400 mg by mouth.    methylPREDNISolone (MEDROL DOSEPACK) 4 mg tablet use as directed (Patient not taking: Reported on 2/5/2022)    metoprolol succinate (TOPROL-XL) 25 MG 24 hr tablet Take 25 mg by mouth 2 (two) times daily.    miconazole (MICOTIN) 2 % cream Apply topically 2 (two) times daily.    potassium chloride (KLOR-CON) 10 MEQ TbSR Take 10 mEq by  mouth.    sotaloL (BETAPACE) 80 MG tablet Take 80 mg by mouth.     Family History    None       Tobacco Use    Smoking status: Never    Smokeless tobacco: Never   Substance and Sexual Activity    Alcohol use: Not on file    Drug use: Not on file    Sexual activity: Not on file     Review of Systems   Unable to perform ROS: Dementia   Objective:     Vital Signs (Most Recent):  Temp: 97.8 °F (36.6 °C) (01/22/23 1927)  Pulse: (!) 116 (01/23/23 0517)  Resp: 17 (01/23/23 0517)  BP: (!) 154/94 (01/23/23 0517)  SpO2: 100 % (01/23/23 0517)   Vital Signs (24h Range):  Temp:  [97.8 °F (36.6 °C)] 97.8 °F (36.6 °C)  Pulse:  [] 116  Resp:  [9-32] 17  SpO2:  [95 %-100 %] 100 %  BP: (115-154)/() 154/94     Weight: 80.7 kg (177 lb 14.4 oz) (per NH paperwork)  Body mass index is 24.81 kg/m².    Physical Exam  Vitals and nursing note reviewed.   Constitutional:       Appearance: He is ill-appearing.   HENT:      Head: Normocephalic and atraumatic.      Comments: Hard of hearing     Right Ear: There is no impacted cerumen.      Left Ear: There is no impacted cerumen.      Nose: No congestion or rhinorrhea.      Mouth/Throat:      Pharynx: No oropharyngeal exudate or posterior oropharyngeal erythema.   Eyes:      General:         Right eye: No discharge.         Left eye: No discharge.   Neck:      Vascular: No carotid bruit.   Cardiovascular:      Rate and Rhythm: Tachycardia present. Rhythm irregular.      Heart sounds: No murmur heard.  Pulmonary:      Effort: No respiratory distress.   Abdominal:      Tenderness: There is no abdominal tenderness. There is no guarding.   Genitourinary:     Rectum: Guaiac result negative.   Musculoskeletal:         General: No swelling or deformity.      Cervical back: No tenderness.   Skin:     Findings: No erythema or rash.   Neurological:      Mental Status: He is disoriented.      Sensory: No sensory deficit.      Comments: Unable to communicate           Significant Labs: All  pertinent labs within the past 24 hours have been reviewed.  A1C: No results for input(s): HGBA1C in the last 4320 hours.  ABGs:   Recent Labs   Lab 01/22/23 2037   PH 7.370   PCO2 37.2   HCO3 21.5*   POCSATURATED 35*   BE -4   PO2 21*     Blood Culture: No results for input(s): LABBLOO in the last 48 hours.  CBC:   Recent Labs   Lab 01/22/23 2035 01/23/23  0506   WBC 15.93* 14.12*   HGB 14.4 14.1   HCT 44.4 45.0    139*     CMP:   Recent Labs   Lab 01/22/23 2035 01/23/23  0506   * 154*   K 4.0 4.0   * 117*   CO2 23 22*   * 115*   BUN 45* 37*   CREATININE 1.4 1.1   CALCIUM 9.3 8.6*   PROT 6.2  --    ALBUMIN 3.2*  --    BILITOT 1.2*  --    ALKPHOS 94  --    AST 26  --    ALT 46*  --    ANIONGAP 14 15     Lipid Panel: No results for input(s): CHOL, HDL, LDLCALC, TRIG, CHOLHDL in the last 48 hours.  TSH: No results for input(s): TSH in the last 4320 hours.    Significant Imaging: I have reviewed all pertinent imaging results/findings within the past 24 hours.  I have reviewed and interpreted all pertinent imaging results/findings within the past 24 hours.

## 2023-01-23 NOTE — ASSESSMENT & PLAN NOTE
Possible due to poor oral intake.    Initial Na: 149, with subsequent Na; 154     Plan:  ->s/p 2L of NS in ED  ->start D5W at 100cc an hour  ->BMP every 6 hours  ->consider nephrology consult

## 2023-01-23 NOTE — ED NOTES
Patient only tolerated a portion of crushed meds in applesauce but refused to swallow and spit applesauce/medicine mixture out. Attempted to feed patient meds several times. Patient is highly confused and difficult to give PO meds to. MD notified.

## 2023-01-23 NOTE — PHARMACY MED REC
"  Admission Medication History     The home medication history was taken by Lorin Mayer CPhT.    Medication history obtained from, [facility]    You may go to "Admission" then "Reconcile Home Medications" tabs to review and/or act upon these items.     The home medication list has been updated by the Pharmacy department.   Please read ALL comments highlighted in yellow.   Please address this information as you see fit.    Feel free to contact us if you have any questions or require assistance.      The medications listed below were removed from the home medication list.  Please reorder if appropriate:  Patient reports no longer taking the following medication(s):  Albuterol 2.5 mg/3ml Neb  Albuterol HFA 90 mcg Inhaler  Amlodipine 5 mg and 10 mg  Eliquis 5 mg  Buspirone 5 mg  Cephalexin 500 mg  Clindamycin 300 mg  Clonazepam 0.5 mg  Lexapro 10 mg  Gabapentin 100 mg  Haloperidol 5 mg/ml injection  Hydrocortisone 2.5% cream  Combivent  mcg Inhaler  Melatonin 3 mg  Medrol Dosepack 4 mg  Miconazole 2% cream  Triple Antibiotic ointment  Zyprexa 5 mg  Sotalol 80 mg  Preservision Areds        Lorin Mayer CPhT.  Ext [phone]             .        "

## 2023-01-23 NOTE — ED PROVIDER NOTES
"Encounter Date: 1/22/2023       History     Chief Complaint   Patient presents with    Shortness of Breath     [facility] to SOB that began tonight. Initial room air sat 85% per ems. Pt also noted to be in afib rvr en route and received 10mg metoprolol. Pt has hx of dementia and per NH "is at baseline but more combative".      89-year-old male who [facility] for chief complaint of shortness of breath that began tonight.  85% oxygen saturations on arrival.  He is in atrial fibrillation with rapid ventricular response on arrival.  Is a baseline which is nonverbal however facility states that he has been more combative.    Review of patient's allergies indicates:   Allergen Reactions    Ace inhibitors      Past Medical History:   Diagnosis Date    Hyperlipidemia     Hypertension     Hyperthyroidism      No past surgical history on file.  No family history on file.  Social History     Tobacco Use    Smoking status: Never    Smokeless tobacco: Never     Review of Systems   Unable to perform ROS: Patient nonverbal     Physical Exam     Initial Vitals [01/22/23 1927]   BP Pulse Resp Temp SpO2   116/89 (!) 130 (!) 28 97.8 °F (36.6 °C) 99 %      MAP       --         Physical Exam    Nursing note and vitals reviewed.  Constitutional:   89-year-old male nonverbal at baseline appears to be in no acute distress but does have these periods of tachypnea they are short-lived he is having no cough   HENT:   Head: Normocephalic and atraumatic.   Cardiovascular:            Tachycardic rate with irregular rhythm   Pulmonary/Chest: Breath sounds normal. He has no wheezes.   Abdominal: Abdomen is soft. There is no abdominal tenderness.   Musculoskeletal:         General: Normal range of motion.     Neurological:   At baseline neurological status   Skin: Skin is warm.       ED Course   Critical Care    Date/Time: 1/23/2023 12:51 AM  Performed by: Harshad Mooney DO  Authorized " by: Harshad Mooney DO   Direct patient critical care time: 25 minutes  Additional history critical care time: 10 minutes  Ordering / reviewing critical care time: 5 minutes  Documentation critical care time: 5 minutes  Total critical care time (exclusive of procedural time) : 45 minutes  Critical care time was exclusive of separately billable procedures and treating other patients.  Critical care was necessary to treat or prevent imminent or life-threatening deterioration of the following conditions: cardiac failure and circulatory failure.  Critical care was time spent personally by me on the following activities: development of treatment plan with patient or surrogate, discussions with consultants, interpretation of cardiac output measurements, evaluation of patient's response to treatment, examination of patient, obtaining history from patient or surrogate, ordering and performing treatments and interventions, ordering and review of laboratory studies, ordering and review of radiographic studies, re-evaluation of patient's condition and review of old charts.      Labs Reviewed   CBC W/ AUTO DIFFERENTIAL - Abnormal; Notable for the following components:       Result Value    WBC 15.93 (*)     RDW 16.7 (*)     Immature Granulocytes 0.6 (*)     Gran # (ANC) 13.7 (*)     Immature Grans (Abs) 0.09 (*)     Mono # 1.2 (*)     Gran % 85.7 (*)     Lymph % 6.0 (*)     All other components within normal limits   COMPREHENSIVE METABOLIC PANEL - Abnormal; Notable for the following components:    Sodium 149 (*)     Chloride 112 (*)     Glucose 141 (*)     BUN 45 (*)     Albumin 3.2 (*)     Total Bilirubin 1.2 (*)     ALT 46 (*)     eGFR 47 (*)     All other components within normal limits   TROPONIN I - Abnormal; Notable for the following components:    Troponin I 0.095 (*)     All other components within normal limits   B-TYPE NATRIURETIC PEPTIDE - Abnormal; Notable for the following components:    BNP 1,266 (*)      All other components within normal limits   POCT GLUCOSE - Abnormal; Notable for the following components:    POCT Glucose 127 (*)     All other components within normal limits   ISTAT PROCEDURE - Abnormal; Notable for the following components:    POC PO2 21 (*)     POC HCO3 21.5 (*)     POC SATURATED O2 35 (*)     POC TCO2 23 (*)     All other components within normal limits   URINALYSIS, REFLEX TO URINE CULTURE   POCT GLUCOSE MONITORING CONTINUOUS     EKG Readings: (Independently Interpreted)   Atrial fibrillation with rapid ventricular response rate of 134, right axis deviation, nonspecific ST-T wave abnormalities interpreted by me    EKG 2 after 10 mg of Cardizem shows atrial fibrillation with a rate of 91, right axis deviation, nonspecific ST-T wave and interpreted by me     Imaging Results              CT Head Without Contrast (Final result)  Result time 01/22/23 22:27:51      Final result by Juanita Scott MD (01/22/23 22:27:51)                   Impression:      No acute intracranial hemorrhage, mass effect or evidence of major arterial infarct.    Low density in the deep white matter as seen with microvascular chronic ischemic changes.    Left parietal encephalomalacia.    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      Electronically signed by: Juanita Scott  Date:    01/22/2023  Time:    22:27               Narrative:    EXAMINATION:  CT HEAD WITHOUT CONTRAST    CLINICAL HISTORY:  AMS;    TECHNIQUE:  Low dose axial images were obtained through the head.  Coronal and sagittal reformations were also performed. Contrast was not administered.    COMPARISON:  None.    FINDINGS:  There is no acute intracranial hemorrhage are hematoma.  Punctate low density in the left side of qing is suggest lacunar type infarct.  There is confluent low density in the deep white matter as seen with microvascular chronic ischemic changes.  Focus of encephalomalacia in the left parietal lobe is noted.  The  gray-white matter junction differentiation otherwise appears to be intact.  There is no mass or mass effect.  There is prominence of ventricles, cisterns and sulci consistent with senescent atrophic changes.    Left maxillary sinus is opacified.  The remaining sinuses imaged are clear.  Mastoid air cells are clear.  Bony calvarium grossly appears intact.                                       X-Ray Chest AP Portable (Final result)  Result time 01/22/23 20:43:04      Final result by Dash Sinclair MD (01/22/23 20:43:04)                   Impression:      1. Coarse interstitial attenuation may reflect mild interstitial edema, no large focal consolidation.      Electronically signed by: Dash Sinclair MD  Date:    01/22/2023  Time:    20:43               Narrative:    EXAMINATION:  XR CHEST AP PORTABLE    CLINICAL HISTORY:  CHF;    TECHNIQUE:  Single frontal view of the chest was performed.    COMPARISON:  03/07/2022    FINDINGS:  The cardiomediastinal silhouette is not enlarged, magnified by technique noting calcification of the aorta and surgical changes.  There is elevation of the right hemidiaphragm..  There is no pleural effusion.  The trachea is midline.  The lungs are symmetrically expanded bilaterally with coarse interstitial attenuation.  There is left basilar subsegmental atelectasis or scarring..  No large focal consolidation seen.  There is no pneumothorax.  The osseous structures are remarkable for degenerative changes and osteopenia..                                       Medications   lactated ringers bolus 2,000 mL (has no administration in time range)   sodium chloride 0.9% flush 10 mL (has no administration in time range)   naloxone 0.4 mg/mL injection 0.02 mg (has no administration in time range)   glucose chewable tablet 16 g (has no administration in time range)   glucose chewable tablet 24 g (has no administration in time range)   glucagon (human recombinant) injection 1 mg (has no  administration in time range)   dextrose 10% bolus 125 mL 125 mL (has no administration in time range)   dextrose 10% bolus 250 mL 250 mL (has no administration in time range)   ondansetron injection 4 mg (has no administration in time range)   acetaminophen tablet 1,000 mg (has no administration in time range)   melatonin tablet 6 mg (has no administration in time range)   albuterol-ipratropium 2.5 mg-0.5 mg/3 mL nebulizer solution 3 mL (has no administration in time range)   amLODIPine tablet 10 mg (has no administration in time range)   apixaban tablet 5 mg (has no administration in time range)   aspirin chewable tablet 81 mg (has no administration in time range)   donepeziL tablet 5 mg (has no administration in time range)   levothyroxine tablet 50 mcg (has no administration in time range)   losartan tablet 50 mg (has no administration in time range)   magnesium oxide tablet 400 mg (has no administration in time range)   sodium chloride 0.9% bolus 1,000 mL 1,000 mL (0 mLs Intravenous Stopped 1/22/23 2130)   diltiaZEM injection 10 mg (10 mg Intravenous Given 1/22/23 2034)   diltiaZEM injection 10 mg (10 mg Intravenous Given 1/22/23 2254)   diltiaZEM 125 mg in D5W 125 mL infusion (5 mg/hr Intravenous New Bag 1/22/23 2355)   diltiaZEM injection 20 mg (10 mg Intravenous Given 1/22/23 2329)                 ED Course as of 01/23/23 0051   Sun Jan 22, 2023 2104 ISTAT PROCEDURE(!)  Reviewed, No signs of respiratory acidosis or CO2 narcosis [CD]   2107 CBC auto differential(!)  Reviewed, leukocytosis noted nonspecific [CD]   2107 Comprehensive metabolic panel(!)  Reviewed, sodium 149 chloride 112 consistent with most likely dehydration [CD]   2107 Brain natriuretic peptide(!)  Reviewed, most likely related to congestive heart failure and history of atrial fibrillation [CD]   2108 Troponin I(!)  Reviewed, mildly elevated, questionable demand [CD]   2213 Pt back in RVR will start cardizem gtt [CD]   2347 Rate is  controlled, Cardizem drip has been started [CD]   2348 X-Ray Chest AP Portable  Reviewed, no focal consolidation [CD]   2348 CT Head Without Contrast  Reviewed, no acute intracranial hemorrhage, lacunar type infarct noted in the qing [CD]   Mon Jan 23, 2023   0021 [facility] medicine will evaluate the patient for admission [CD]      ED Course User Index  [CD] Harshad Mooney DO                 Clinical Impression:   Final diagnoses:  [R06.02] Shortness of breath  [I48.91] Atrial fibrillation with rapid ventricular response (Primary)        ED Disposition Condition    Admit                 Harshad Mooney DO  01/23/23 0052

## 2023-01-23 NOTE — CONSULTS
[facility] - Emergency Dept  Cardiology  Consult Note    Patient Name: Yunier Aguillon  MRN: [id number]  Admission Date: 1/22/2023  Hospital Length of Stay: 0 days  Code Status: DNR   Attending Provider: Renny Oliveira MD   Consulting Provider: Stephan Sharif NP  Primary Care Physician: Tamir Daniels MD  Principal Problem:Atrial fibrillation with RVR    Patient information was obtained from patient, past medical records and ER records.     Inpatient consult to [facility]  Consult performed by: Stephan Sharif NP  Consult ordered by: Aubree Ziegler MD        Subjective:     Chief Complaint:  SOB     HPI:   HPI retrieved from chart. Patient is nonverbal. Yunier Aguillon is 89yr old male with  dementia, HLD, HTN, Afib, CAD s/p 3V CABG, and BPH who presents with SOB.  Per chart, pt presented with SOB that began on Sunday evening, with no associated fever, cough, or phlegm production. He was noted to be sating around 85% on RA, while in route. He was also noted to be in Afib with RVR, and he received 10mg of metoprolol. In addition, facility reports that pt has been a bit more combative then normal. In ED, triage vitals were significant for tachycadia and tachypnea. CBC was significant for leukocytosis. CMP was significant for hypernatremia, elevated glucose, and elevated ALT. BNP was 1,266 and troponin was .095. CT head showed no acute intracranial hemorrhage, with lacunar type infarct in the left side of the qing. CXR showed no focal consolidation, although pt with course interstitial findings. Patient on cardizem gtt. BB initiated. TTE pending.          Past Medical History:   Diagnosis Date    Hyperlipidemia     Hypertension     Hyperthyroidism        History reviewed. No pertinent surgical history.    Review of patient's allergies indicates:   Allergen Reactions    Ace inhibitors        No current facility-administered medications on file prior to encounter.     Current Outpatient Medications  on File Prior to Encounter   Medication Sig    albuterol (PROVENTIL) 2.5 mg /3 mL (0.083 %) nebulizer solution Take 3 mLs (2.5 mg total) by nebulization every 6 (six) hours as needed for Wheezing or Shortness of Breath (cough). Rescue    albuterol (PROVENTIL/VENTOLIN HFA) 90 mcg/actuation inhaler albuterol sulfate HFA 90 mcg/actuation aerosol inhaler    amLODIPine (NORVASC) 10 MG tablet Take 10 mg by mouth.    apixaban (ELIQUIS) 5 mg (74 tabs) DsPk Take 5 mg by mouth.    aspirin 81 MG Chew aspirin 81 mg chewable tablet   Chew 1 tablet every day by oral route.    atorvastatin (LIPITOR) 80 MG tablet Take 80 mg by mouth.    cholecalciferol, vitamin D3, (VITAMIN D3) 50 mcg (2,000 unit) Tab Take 2,000 Units by mouth.    donepeziL (ARICEPT) 5 MG tablet donepezil 5 mg tablet    EUTHYROX 50 mcg tablet Take 1 tablet (50 mcg total) by mouth every morning.    furosemide (LASIX) 20 MG tablet Take 1 tablet (20 mg total) by mouth once daily.    ipratropium-albuteroL (COMBIVENT)  mcg/actuation inhaler Inhale 1 puff into the lungs every 6 to 8 hours as needed for Wheezing or Shortness of Breath. Rescue    losartan (COZAAR) 50 MG tablet Take 50 mg by mouth once daily.    magnesium oxide (MAG-OX) 400 mg (241.3 mg magnesium) tablet Take 400 mg by mouth.    methylPREDNISolone (MEDROL DOSEPACK) 4 mg tablet use as directed (Patient not taking: Reported on 2/5/2022)    metoprolol succinate (TOPROL-XL) 25 MG 24 hr tablet Take 25 mg by mouth 2 (two) times daily.    miconazole (MICOTIN) 2 % cream Apply topically 2 (two) times daily.    potassium chloride (KLOR-CON) 10 MEQ TbSR Take 10 mEq by mouth.    sotaloL (BETAPACE) 80 MG tablet Take 80 mg by mouth.     Family History    None       Tobacco Use    Smoking status: Never    Smokeless tobacco: Never   Substance and Sexual Activity    Alcohol use: Not on file    Drug use: Not on file    Sexual activity: Not on file     Review of Systems   Unable to perform ROS:  Dementia   Objective:     Vital Signs (Most Recent):  Temp: 97.8 °F (36.6 °C) (01/22/23 1927)  Pulse: (!) 117 (01/23/23 1007)  Resp: 17 (01/23/23 1007)  BP: 117/83 (01/23/23 1002)  SpO2: 100 % (01/23/23 1007)   Vital Signs (24h Range):  Temp:  [97.8 °F (36.6 °C)] 97.8 °F (36.6 °C)  Pulse:  [] 117  Resp:  [9-32] 17  SpO2:  [95 %-100 %] 100 %  BP: (115-154)/() 117/83     Weight: 80.7 kg (177 lb 14.4 oz) (per NH paperwork)  Body mass index is 24.81 kg/m².    SpO2: 100 %         Intake/Output Summary (Last 24 hours) at 1/23/2023 1014  Last data filed at 1/23/2023 0653  Gross per 24 hour   Intake 2020.01 ml   Output --   Net 2020.01 ml       Lines/Drains/Airways       Peripheral Intravenous Line  Duration                  Peripheral IV - Single Lumen 01/22/23 1929 18 G Right Antecubital <1 day         Peripheral IV - Single Lumen 01/22/23 2230 18 G Left;Posterior Forearm <1 day                    Physical Exam  Constitutional:       General: He is not in acute distress.     Appearance: He is not diaphoretic.   HENT:      Head: Atraumatic.   Eyes:      General:         Right eye: No discharge.         Left eye: No discharge.   Cardiovascular:      Rate and Rhythm: Tachycardia present. Rhythm irregular.      Heart sounds: Murmur heard.   Pulmonary:      Effort: Pulmonary effort is normal.      Breath sounds: No rales.   Abdominal:      General: Bowel sounds are normal.      Palpations: Abdomen is soft.   Musculoskeletal:      Right lower leg: No edema.      Left lower leg: No edema.   Skin:     General: Skin is warm and dry.   Neurological:      Mental Status: He is alert. Mental status is at baseline.       Significant Labs: BMP:   Recent Labs   Lab 01/22/23 2035 01/23/23  0506   * 115*   * 154*   K 4.0 4.0   * 117*   CO2 23 22*   BUN 45* 37*   CREATININE 1.4 1.1   CALCIUM 9.3 8.6*   , CMP   Recent Labs   Lab 01/22/23 2035 01/23/23  0506   * 154*   K 4.0 4.0   * 117*   CO2 23  22*   * 115*   BUN 45* 37*   CREATININE 1.4 1.1   CALCIUM 9.3 8.6*   PROT 6.2  --    ALBUMIN 3.2*  --    BILITOT 1.2*  --    ALKPHOS 94  --    AST 26  --    ALT 46*  --    ANIONGAP 14 15   , CBC   Recent Labs   Lab 01/22/23 2035 01/23/23  0506   WBC 15.93* 14.12*   HGB 14.4 14.1   HCT 44.4 45.0    139*   , INR No results for input(s): INR, PROTIME in the last 48 hours., Lipid Panel No results for input(s): CHOL, HDL, LDLCALC, TRIG, CHOLHDL in the last 48 hours., Troponin   Recent Labs   Lab 01/22/23 2035   TROPONINI 0.095*   , and All pertinent lab results from the last 24 hours have been reviewed.    Significant Imaging: Echocardiogram: Transthoracic echo (TTE) complete (Cupid Only): No results found for this or any previous visit.    Assessment and Plan:     * Atrial fibrillation with RVR  Patient with history of AF  Continue Eliquis, BB initiated, will continue cardizem gtt for now  TTE pending   Patient appears asymptomatic   Rate control strategy   Resume home sotalol   ? Stress induced RVR in setting of dehydration / infection     Vascular dementia without behavioral disturbance  Stable at baseline per chart    History of transcatheter aortic valve replacement (TAVR)  S/p TAVR 2019  TTE pending     Coronary artery disease involving coronary bypass graft of native heart without angina pectoris  S/p 3V CABG  On BB  Resume statin, asa, ARB         VTE Risk Mitigation (From admission, onward)         Ordered     apixaban tablet 5 mg  2 times daily         01/23/23 0028     IP VTE HIGH RISK PATIENT  Once         01/23/23 0024     Place sequential compression device  Until discontinued         01/23/23 0024                Thank you for your consult. I will follow-up with patient. Please contact us if you have any additional questions.    Stephan Sharif NP  Cardiology   [facility] - Emergency Dept

## 2023-01-23 NOTE — ED NOTES
Assumed care of pt boarding in ED. Pt in A-fib and on cardizem gtt. IVF of NS infusing. Pt is also very hard of hearing and does not have his hearing aids. Pt denying complaints at this time.

## 2023-01-23 NOTE — HPI
Yunier Aguillon is 89yr old male with PMH of dementia, HLD, HTN, Afib, and BPH who presents with SOB.     Due to dementia and difficulty hearing, the pt wasn't able to give much of a story. Per EMS, pt presented with SOB that began on Sunday evening, with no associated fever, cough, or phlegm production. He was noted to be sating around 85% on RA, while in route. He was also noted to be in Afib with RVR, and he received 10mg of metoprolol. In addition, facility reports that pt has been a bit more combative then normal.     In ED, triage vitals were significant for tachycadia and tachypnea. CBC was significant for leukocytosis. CMP was significant for hypernatremia, elevated glucose, and elevated ALT. BNP was 1,266 and troponin was .095. CT head showed no acute intracranial hemorrhage, with lacunar type infarct in the left side of the qing. CXR showed no focal consolidation, although pt with course interstitial findings.     Pt was started on a dilitazem drip, with some improvement in the RVR     Medicine was consulted for admission for Afib with RVR treatment

## 2023-01-23 NOTE — ASSESSMENT & PLAN NOTE
Noted on CT    Plan:  C/w ASA and Apixaban  Hold statin due to slightly elevated LFTs     decreased vini/decreased step length/decreased stride length/decreased weight-shifting ability

## 2023-01-23 NOTE — ASSESSMENT & PLAN NOTE
Patient with Persistent (7 days or more) atrial fibrillation which is controlled currently with Diltazpam. Patient is currently in atrial fibrillation.[id number] Score: 3. HASBLED Score: 2+. Anticoagulation indicated. Anticoagulation done with Apixaban.    ---------------------------------------------------------  Unclear etiology for what caused pt to go into Afib with RVR. CXR without obvious pneumonia. UA is pending. No diarrhea or GI symptoms.       Plan:  Treat underlying etiology  C/w diliazem drip. Consider transitioning to orals in AM  C/w apixaban  Consult Cardiology

## 2023-01-23 NOTE — ASSESSMENT & PLAN NOTE
Patient with history of AF  Continue Eliquis, BB initiated, will continue cardizem gtt for now  TTE pending   Patient appears asymptomatic   Rate control strategy   Resume home sotalol   ? Stress induced RVR in setting of dehydration / infection

## 2023-01-23 NOTE — SUBJECTIVE & OBJECTIVE
Past Medical History:   Diagnosis Date    Hyperlipidemia     Hypertension     Hyperthyroidism        History reviewed. No pertinent surgical history.    Review of patient's allergies indicates:   Allergen Reactions    Ace inhibitors        No current facility-administered medications on file prior to encounter.     Current Outpatient Medications on File Prior to Encounter   Medication Sig    albuterol (PROVENTIL) 2.5 mg /3 mL (0.083 %) nebulizer solution Take 3 mLs (2.5 mg total) by nebulization every 6 (six) hours as needed for Wheezing or Shortness of Breath (cough). Rescue    albuterol (PROVENTIL/VENTOLIN HFA) 90 mcg/actuation inhaler albuterol sulfate HFA 90 mcg/actuation aerosol inhaler    amLODIPine (NORVASC) 10 MG tablet Take 10 mg by mouth.    apixaban (ELIQUIS) 5 mg (74 tabs) DsPk Take 5 mg by mouth.    aspirin 81 MG Chew aspirin 81 mg chewable tablet   Chew 1 tablet every day by oral route.    atorvastatin (LIPITOR) 80 MG tablet Take 80 mg by mouth.    cholecalciferol, vitamin D3, (VITAMIN D3) 50 mcg (2,000 unit) Tab Take 2,000 Units by mouth.    donepeziL (ARICEPT) 5 MG tablet donepezil 5 mg tablet    EUTHYROX 50 mcg tablet Take 1 tablet (50 mcg total) by mouth every morning.    furosemide (LASIX) 20 MG tablet Take 1 tablet (20 mg total) by mouth once daily.    ipratropium-albuteroL (COMBIVENT)  mcg/actuation inhaler Inhale 1 puff into the lungs every 6 to 8 hours as needed for Wheezing or Shortness of Breath. Rescue    losartan (COZAAR) 50 MG tablet Take 50 mg by mouth once daily.    magnesium oxide (MAG-OX) 400 mg (241.3 mg magnesium) tablet Take 400 mg by mouth.    methylPREDNISolone (MEDROL DOSEPACK) 4 mg tablet use as directed (Patient not taking: Reported on 2/5/2022)    metoprolol succinate (TOPROL-XL) 25 MG 24 hr tablet Take 25 mg by mouth 2 (two) times daily.    miconazole (MICOTIN) 2 % cream Apply topically 2 (two) times daily.    potassium chloride (KLOR-CON) 10 MEQ TbSR Take 10 mEq by  mouth.    sotaloL (BETAPACE) 80 MG tablet Take 80 mg by mouth.     Family History    None       Tobacco Use    Smoking status: Never    Smokeless tobacco: Never   Substance and Sexual Activity    Alcohol use: Not on file    Drug use: Not on file    Sexual activity: Not on file     Review of Systems   Unable to perform ROS: Dementia   Objective:     Vital Signs (Most Recent):  Temp: 97.8 °F (36.6 °C) (01/22/23 1927)  Pulse: (!) 117 (01/23/23 1007)  Resp: 17 (01/23/23 1007)  BP: 117/83 (01/23/23 1002)  SpO2: 100 % (01/23/23 1007)   Vital Signs (24h Range):  Temp:  [97.8 °F (36.6 °C)] 97.8 °F (36.6 °C)  Pulse:  [] 117  Resp:  [9-32] 17  SpO2:  [95 %-100 %] 100 %  BP: (115-154)/() 117/83     Weight: 80.7 kg (177 lb 14.4 oz) (per NH paperwork)  Body mass index is 24.81 kg/m².    SpO2: 100 %         Intake/Output Summary (Last 24 hours) at 1/23/2023 1014  Last data filed at 1/23/2023 0653  Gross per 24 hour   Intake 2020.01 ml   Output --   Net 2020.01 ml       Lines/Drains/Airways       Peripheral Intravenous Line  Duration                  Peripheral IV - Single Lumen 01/22/23 1929 18 G Right Antecubital <1 day         Peripheral IV - Single Lumen 01/22/23 2230 18 G Left;Posterior Forearm <1 day                    Physical Exam  Constitutional:       General: He is not in acute distress.     Appearance: He is not diaphoretic.   HENT:      Head: Atraumatic.   Eyes:      General:         Right eye: No discharge.         Left eye: No discharge.   Cardiovascular:      Rate and Rhythm: Tachycardia present. Rhythm irregular.      Heart sounds: Murmur heard.   Pulmonary:      Effort: Pulmonary effort is normal.      Breath sounds: No rales.   Abdominal:      General: Bowel sounds are normal.      Palpations: Abdomen is soft.   Musculoskeletal:      Right lower leg: No edema.      Left lower leg: No edema.   Skin:     General: Skin is warm and dry.   Neurological:      Mental Status: He is alert. Mental status is at  baseline.       Significant Labs: BMP:   Recent Labs   Lab 01/22/23 2035 01/23/23  0506   * 115*   * 154*   K 4.0 4.0   * 117*   CO2 23 22*   BUN 45* 37*   CREATININE 1.4 1.1   CALCIUM 9.3 8.6*   , CMP   Recent Labs   Lab 01/22/23 2035 01/23/23  0506   * 154*   K 4.0 4.0   * 117*   CO2 23 22*   * 115*   BUN 45* 37*   CREATININE 1.4 1.1   CALCIUM 9.3 8.6*   PROT 6.2  --    ALBUMIN 3.2*  --    BILITOT 1.2*  --    ALKPHOS 94  --    AST 26  --    ALT 46*  --    ANIONGAP 14 15   , CBC   Recent Labs   Lab 01/22/23 2035 01/23/23  0506   WBC 15.93* 14.12*   HGB 14.4 14.1   HCT 44.4 45.0    139*   , INR No results for input(s): INR, PROTIME in the last 48 hours., Lipid Panel No results for input(s): CHOL, HDL, LDLCALC, TRIG, CHOLHDL in the last 48 hours., Troponin   Recent Labs   Lab 01/22/23 2035   TROPONINI 0.095*   , and All pertinent lab results from the last 24 hours have been reviewed.    Significant Imaging: Echocardiogram: Transthoracic echo (TTE) complete (Cupid Only): No results found for this or any previous visit.

## 2023-01-23 NOTE — ED NOTES
Patient ate 75% of mechanical soft diet and drank 5 cups of water and 1 juice per ED tech. Patient tolerated well.

## 2023-01-23 NOTE — H&P
"[facility]  History & Physical    Patient Name: Yunier Aguillon  MRN: [id number]  Patient Class: IP- Inpatient  Admission Date: 1/22/2023  Attending Physician: Aubree Ziegler MD  Primary Care Provider: Tamir Daniels MD         Patient information was obtained from EMS personnel and ER records.     Subjective:     Principal Problem:Atrial fibrillation with RVR    Chief Complaint:   Chief Complaint   Patient presents with    Shortness of Breath     [facility] secondary to SOB that began tonight. Initial room air sat 85% per ems. Pt also noted to be in afib rvr en route and received 10mg metoprolol. Pt has hx of dementia and per NH "is at baseline but more combative".         HPI: Yunier Aguillon is 89yr old male with PMH of dementia, HLD, HTN, Afib, and BPH who presents with SOB.     Due to dementia and difficulty hearing, the pt wasn't able to give much of a story. Per EMS, pt presented with SOB that began on Sunday evening, with no associated fever, cough, or phlegm production. He was noted to be sating around 85% on RA, while in route. He was also noted to be in Afib with RVR, and he received 10mg of metoprolol. In addition, facility reports that pt has been a bit more combative then normal.     In ED, triage vitals were significant for tachycadia and tachypnea. CBC was significant for leukocytosis. CMP was significant for hypernatremia, elevated glucose, and elevated ALT. BNP was 1,266 and troponin was .095. CT head showed no acute intracranial hemorrhage, with lacunar type infarct in the left side of the qing. CXR showed no focal consolidation, although pt with course interstitial findings.     Pt was started on a dilitazem drip, with some improvement in the RVR     Medicine was consulted for admission for Afib with RVR treatment        Past Medical History:   Diagnosis Date    Hyperlipidemia     Hypertension     Hyperthyroidism        History " reviewed. No pertinent surgical history.    Review of patient's allergies indicates:   Allergen Reactions    Ace inhibitors        No current facility-administered medications on file prior to encounter.     Current Outpatient Medications on File Prior to Encounter   Medication Sig    albuterol (PROVENTIL) 2.5 mg /3 mL (0.083 %) nebulizer solution Take 3 mLs (2.5 mg total) by nebulization every 6 (six) hours as needed for Wheezing or Shortness of Breath (cough). Rescue    albuterol (PROVENTIL/VENTOLIN HFA) 90 mcg/actuation inhaler albuterol sulfate HFA 90 mcg/actuation aerosol inhaler    amLODIPine (NORVASC) 10 MG tablet Take 10 mg by mouth.    apixaban (ELIQUIS) 5 mg (74 tabs) DsPk Take 5 mg by mouth.    aspirin 81 MG Chew aspirin 81 mg chewable tablet   Chew 1 tablet every day by oral route.    atorvastatin (LIPITOR) 80 MG tablet Take 80 mg by mouth.    cholecalciferol, vitamin D3, (VITAMIN D3) 50 mcg (2,000 unit) Tab Take 2,000 Units by mouth.    donepeziL (ARICEPT) 5 MG tablet donepezil 5 mg tablet    EUTHYROX 50 mcg tablet Take 1 tablet (50 mcg total) by mouth every morning.    furosemide (LASIX) 20 MG tablet Take 1 tablet (20 mg total) by mouth once daily.    ipratropium-albuteroL (COMBIVENT)  mcg/actuation inhaler Inhale 1 puff into the lungs every 6 to 8 hours as needed for Wheezing or Shortness of Breath. Rescue    losartan (COZAAR) 50 MG tablet Take 50 mg by mouth once daily.    magnesium oxide (MAG-OX) 400 mg (241.3 mg magnesium) tablet Take 400 mg by mouth.    methylPREDNISolone (MEDROL DOSEPACK) 4 mg tablet use as directed (Patient not taking: Reported on 2/5/2022)    metoprolol succinate (TOPROL-XL) 25 MG 24 hr tablet Take 25 mg by mouth 2 (two) times daily.    miconazole (MICOTIN) 2 % cream Apply topically 2 (two) times daily.    potassium chloride (KLOR-CON) 10 MEQ TbSR Take 10 mEq by mouth.    sotaloL (BETAPACE) 80 MG tablet Take 80 mg by mouth.     Family History    None        Tobacco Use    Smoking status: Never    Smokeless tobacco: Never   Substance and Sexual Activity    Alcohol use: Not on file    Drug use: Not on file    Sexual activity: Not on file     Review of Systems   Unable to perform ROS: Dementia   Objective:     Vital Signs (Most Recent):  Temp: 97.8 °F (36.6 °C) (01/22/23 1927)  Pulse: (!) 116 (01/23/23 0517)  Resp: 17 (01/23/23 0517)  BP: (!) 154/94 (01/23/23 0517)  SpO2: 100 % (01/23/23 0517)   Vital Signs (24h Range):  Temp:  [97.8 °F (36.6 °C)] 97.8 °F (36.6 °C)  Pulse:  [] 116  Resp:  [9-32] 17  SpO2:  [95 %-100 %] 100 %  BP: (115-154)/() 154/94     Weight: 80.7 kg (177 lb 14.4 oz) (per NH paperwork)  Body mass index is 24.81 kg/m².    Physical Exam  Vitals and nursing note reviewed.   Constitutional:       Appearance: He is ill-appearing.   HENT:      Head: Normocephalic and atraumatic.      Comments: Hard of hearing     Right Ear: There is no impacted cerumen.      Left Ear: There is no impacted cerumen.      Nose: No congestion or rhinorrhea.      Mouth/Throat:      Pharynx: No oropharyngeal exudate or posterior oropharyngeal erythema.   Eyes:      General:         Right eye: No discharge.         Left eye: No discharge.   Neck:      Vascular: No carotid bruit.   Cardiovascular:      Rate and Rhythm: Tachycardia present. Rhythm irregular.      Heart sounds: No murmur heard.  Pulmonary:      Effort: No respiratory distress.   Abdominal:      Tenderness: There is no abdominal tenderness. There is no guarding.   Genitourinary:     Rectum: Guaiac result negative.   Musculoskeletal:         General: No swelling or deformity.      Cervical back: No tenderness.   Skin:     Findings: No erythema or rash.   Neurological:      Mental Status: He is disoriented.      Sensory: No sensory deficit.      Comments: Unable to communicate           Significant Labs: All pertinent labs within the past 24 hours have been reviewed.  A1C: No results for input(s):  HGBA1C in the last 4320 hours.  ABGs:   Recent Labs   Lab 01/22/23 2037   PH 7.370   PCO2 37.2   HCO3 21.5*   POCSATURATED 35*   BE -4   PO2 21*     Blood Culture: No results for input(s): LABBLOO in the last 48 hours.  CBC:   Recent Labs   Lab 01/22/23 2035 01/23/23  0506   WBC 15.93* 14.12*   HGB 14.4 14.1   HCT 44.4 45.0    139*     CMP:   Recent Labs   Lab 01/22/23 2035 01/23/23  0506   * 154*   K 4.0 4.0   * 117*   CO2 23 22*   * 115*   BUN 45* 37*   CREATININE 1.4 1.1   CALCIUM 9.3 8.6*   PROT 6.2  --    ALBUMIN 3.2*  --    BILITOT 1.2*  --    ALKPHOS 94  --    AST 26  --    ALT 46*  --    ANIONGAP 14 15     Lipid Panel: No results for input(s): CHOL, HDL, LDLCALC, TRIG, CHOLHDL in the last 48 hours.  TSH: No results for input(s): TSH in the last 4320 hours.    Significant Imaging: I have reviewed all pertinent imaging results/findings within the past 24 hours.  I have reviewed and interpreted all pertinent imaging results/findings within the past 24 hours.    Assessment/Plan:     * Atrial fibrillation with RVR  Patient with Persistent (7 days or more) atrial fibrillation which is controlled currently with Diltazpam. Patient is currently in atrial fibrillation.[id number] Score: 3. HASBLED Score: 2+. Anticoagulation indicated. Anticoagulation done with Apixaban.    ---------------------------------------------------------  Unclear etiology for what caused pt to go into Afib with RVR. CXR without obvious pneumonia. UA is pending. No diarrhea or GI symptoms.       Plan:  Treat underlying etiology  C/w diliazem drip. Consider transitioning to orals in AM  C/w apixaban  Consult Cardiology        Lacunar stroke  Noted on CT    Plan:  C/w ASA and Apixaban  Hold statin due to slightly elevated LFTs      Hypernatremia  Possible due to poor oral intake.    Initial Na: 149, with subsequent Na; 154     Plan:  ->s/p 2L of NS in ED  ->start D5W at 100cc an hour  ->BMP every 6 hours  ->consider  nephrology consult      Vascular dementia without behavioral disturbance  C/w home donepezil 5mg daily      Hypothyroidism, unspecified  C/w synthroid 50mcg dose      Hyperlipidemia  Hold statin due to slighted elevated LFTs      Hypertension  C/w home amlodipine 10mg   C/w home losartan 50mg        VTE Risk Mitigation (From admission, onward)         Ordered     apixaban tablet 5 mg  2 times daily         01/23/23 0028     IP VTE HIGH RISK PATIENT  Once         01/23/23 0024     Place sequential compression device  Until discontinued         01/23/23 0024                   Aubree Ziegler MD  Department of Hospital Medicine   [facility] - Emergency Dept

## 2023-01-23 NOTE — PLAN OF CARE
Swallow eval completed this date in ED, pt was noted to be confused and agitated as eval continued. Pt safe for initiation of mech soft and thin liquids, boost with meals and crushed meds. Will need assist for feeding.

## 2023-01-23 NOTE — HOSPITAL COURSE
01/23/2023 Per HPI   01/24/2023 TTE:  The left ventricle is normal in size with severely decreased systolic function.  The estimated ejection fraction is 20%.  A diastolic pattern consistent with atrial fibrillation observed.  There is severe left ventricular global hypokinesis.  Normal right ventricular size with mildly to moderately reduced right ventricular systolic function.  Severe left atrial enlargement.  Moderate mitral regurgitation.  Moderate pulmonic regurgitation.  There is a Joyner transcutaneously-placed aortic bioprosthesis present. There is trace aortic insufficiency present. 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  The aortic valve mean gradient is 9 mmHg with a dimensionless index of 0.23.  The estimated PA systolic pressure is 36 mmHg.  Elevated central venous pressure (15 mmHg).  Patient remains in AF RVR on tele. BB up titrated, CCB discontinued given depressed LVEF. BP stable.

## 2023-01-23 NOTE — HPI
HPI retrieved from chart. Patient is nonverbal. Yunier Aguillon is 89yr old male with  dementia, HLD, HTN, Afib, CAD s/p 3V CABG, and BPH who presents with SOB.  Per chart, pt presented with SOB that began on Sunday evening, with no associated fever, cough, or phlegm production. He was noted to be sating around 85% on RA, while in route. He was also noted to be in Afib with RVR, and he received 10mg of metoprolol. In addition, facility reports that pt has been a bit more combative then normal. In ED, triage vitals were significant for tachycadia and tachypnea. CBC was significant for leukocytosis. CMP was significant for hypernatremia, elevated glucose, and elevated ALT. BNP was 1,266 and troponin was .095. CT head showed no acute intracranial hemorrhage, with lacunar type infarct in the left side of the qing. CXR showed no focal consolidation, although pt with course interstitial findings. Patient on cardizem gtt. BB initiated. TTE pending.

## 2023-01-23 NOTE — ED NOTES
Now that he is leg-pain-free, encouraged increased walking; discussed also importance of good BG and lipids control; remains on atorvastatin, clopidogrel and ASA   Updates given to patient's daughter Johana [phone].

## 2023-01-24 PROBLEM — Z71.89 ADVANCE CARE PLANNING: Status: ACTIVE | Noted: 2023-01-24

## 2023-01-24 LAB
ANION GAP SERPL CALC-SCNC: 10 MMOL/L (ref 8–16)
ANION GAP SERPL CALC-SCNC: 11 MMOL/L (ref 8–16)
ANION GAP SERPL CALC-SCNC: 12 MMOL/L (ref 8–16)
BASOPHILS # BLD AUTO: 0.03 K/UL (ref 0–0.2)
BASOPHILS NFR BLD: 0.2 % (ref 0–1.9)
BUN SERPL-MCNC: 27 MG/DL (ref 10–30)
BUN SERPL-MCNC: 28 MG/DL (ref 10–30)
BUN SERPL-MCNC: 29 MG/DL (ref 10–30)
BUN SERPL-MCNC: 29 MG/DL (ref 10–30)
BUN SERPL-MCNC: 30 MG/DL (ref 10–30)
CALCIUM SERPL-MCNC: 8.3 MG/DL (ref 8.7–10.5)
CALCIUM SERPL-MCNC: 8.4 MG/DL (ref 8.7–10.5)
CALCIUM SERPL-MCNC: 8.6 MG/DL (ref 8.7–10.5)
CALCIUM SERPL-MCNC: 8.6 MG/DL (ref 8.7–10.5)
CALCIUM SERPL-MCNC: 9 MG/DL (ref 8.7–10.5)
CHLORIDE SERPL-SCNC: 111 MMOL/L (ref 95–110)
CHLORIDE SERPL-SCNC: 112 MMOL/L (ref 95–110)
CHLORIDE SERPL-SCNC: 113 MMOL/L (ref 95–110)
CO2 SERPL-SCNC: 23 MMOL/L (ref 23–29)
CO2 SERPL-SCNC: 24 MMOL/L (ref 23–29)
CREAT SERPL-MCNC: 0.9 MG/DL (ref 0.5–1.4)
DIFFERENTIAL METHOD: ABNORMAL
EOSINOPHIL # BLD AUTO: 0.2 K/UL (ref 0–0.5)
EOSINOPHIL NFR BLD: 1.6 % (ref 0–8)
ERYTHROCYTE [DISTWIDTH] IN BLOOD BY AUTOMATED COUNT: 16.5 % (ref 11.5–14.5)
EST. GFR  (NO RACE VARIABLE): >60 ML/MIN/1.73 M^2
GLUCOSE SERPL-MCNC: 102 MG/DL (ref 70–110)
GLUCOSE SERPL-MCNC: 102 MG/DL (ref 70–110)
GLUCOSE SERPL-MCNC: 108 MG/DL (ref 70–110)
GLUCOSE SERPL-MCNC: 84 MG/DL (ref 70–110)
GLUCOSE SERPL-MCNC: 99 MG/DL (ref 70–110)
HCT VFR BLD AUTO: 45.3 % (ref 40–54)
HGB BLD-MCNC: 14.4 G/DL (ref 14–18)
IMM GRANULOCYTES # BLD AUTO: 0.11 K/UL (ref 0–0.04)
IMM GRANULOCYTES NFR BLD AUTO: 0.9 % (ref 0–0.5)
LYMPHOCYTES # BLD AUTO: 0.7 K/UL (ref 1–4.8)
LYMPHOCYTES NFR BLD: 5.9 % (ref 18–48)
MAGNESIUM SERPL-MCNC: 2.3 MG/DL (ref 1.6–2.6)
MCH RBC QN AUTO: 29.8 PG (ref 27–31)
MCHC RBC AUTO-ENTMCNC: 31.8 G/DL (ref 32–36)
MCV RBC AUTO: 94 FL (ref 82–98)
MONOCYTES # BLD AUTO: 0.9 K/UL (ref 0.3–1)
MONOCYTES NFR BLD: 7.3 % (ref 4–15)
NEUTROPHILS # BLD AUTO: 10.2 K/UL (ref 1.8–7.7)
NEUTROPHILS NFR BLD: 84.1 % (ref 38–73)
NRBC BLD-RTO: 0 /100 WBC
PLATELET # BLD AUTO: 140 K/UL (ref 150–450)
PMV BLD AUTO: 10.2 FL (ref 9.2–12.9)
POCT GLUCOSE: 95 MG/DL (ref 70–110)
POCT GLUCOSE: 97 MG/DL (ref 70–110)
POTASSIUM SERPL-SCNC: 4 MMOL/L (ref 3.5–5.1)
POTASSIUM SERPL-SCNC: 4.2 MMOL/L (ref 3.5–5.1)
POTASSIUM SERPL-SCNC: 4.2 MMOL/L (ref 3.5–5.1)
POTASSIUM SERPL-SCNC: 4.3 MMOL/L (ref 3.5–5.1)
POTASSIUM SERPL-SCNC: 4.6 MMOL/L (ref 3.5–5.1)
RBC # BLD AUTO: 4.83 M/UL (ref 4.6–6.2)
SODIUM SERPL-SCNC: 146 MMOL/L (ref 136–145)
SODIUM SERPL-SCNC: 147 MMOL/L (ref 136–145)
SODIUM SERPL-SCNC: 147 MMOL/L (ref 136–145)
WBC # BLD AUTO: 12.16 K/UL (ref 3.9–12.7)

## 2023-01-24 PROCEDURE — 25000003 PHARM REV CODE 250: Performed by: NURSE PRACTITIONER

## 2023-01-24 PROCEDURE — 36415 COLL VENOUS BLD VENIPUNCTURE: CPT | Performed by: INTERNAL MEDICINE

## 2023-01-24 PROCEDURE — 99233 SBSQ HOSP IP/OBS HIGH 50: CPT | Mod: ,,, | Performed by: NURSE PRACTITIONER

## 2023-01-24 PROCEDURE — 25000003 PHARM REV CODE 250: Performed by: STUDENT IN AN ORGANIZED HEALTH CARE EDUCATION/TRAINING PROGRAM

## 2023-01-24 PROCEDURE — 51702 INSERT TEMP BLADDER CATH: CPT

## 2023-01-24 PROCEDURE — 36415 COLL VENOUS BLD VENIPUNCTURE: CPT | Performed by: STUDENT IN AN ORGANIZED HEALTH CARE EDUCATION/TRAINING PROGRAM

## 2023-01-24 PROCEDURE — 11000001 HC ACUTE MED/SURG PRIVATE ROOM

## 2023-01-24 PROCEDURE — 25000003 PHARM REV CODE 250: Performed by: INTERNAL MEDICINE

## 2023-01-24 PROCEDURE — 80048 BASIC METABOLIC PNL TOTAL CA: CPT | Mod: 91 | Performed by: STUDENT IN AN ORGANIZED HEALTH CARE EDUCATION/TRAINING PROGRAM

## 2023-01-24 PROCEDURE — 80048 BASIC METABOLIC PNL TOTAL CA: CPT | Performed by: STUDENT IN AN ORGANIZED HEALTH CARE EDUCATION/TRAINING PROGRAM

## 2023-01-24 PROCEDURE — 85025 COMPLETE CBC W/AUTO DIFF WBC: CPT | Performed by: STUDENT IN AN ORGANIZED HEALTH CARE EDUCATION/TRAINING PROGRAM

## 2023-01-24 PROCEDURE — 99233 PR SUBSEQUENT HOSPITAL CARE,LEVL III: ICD-10-PCS | Mod: ,,, | Performed by: NURSE PRACTITIONER

## 2023-01-24 PROCEDURE — 83735 ASSAY OF MAGNESIUM: CPT | Performed by: INTERNAL MEDICINE

## 2023-01-24 RX ORDER — MUPIROCIN 20 MG/G
OINTMENT TOPICAL 2 TIMES DAILY
Status: DISCONTINUED | OUTPATIENT
Start: 2023-01-24 | End: 2023-01-27 | Stop reason: HOSPADM

## 2023-01-24 RX ORDER — METOPROLOL TARTRATE 50 MG/1
50 TABLET ORAL 2 TIMES DAILY
Status: DISCONTINUED | OUTPATIENT
Start: 2023-01-24 | End: 2023-01-25

## 2023-01-24 RX ADMIN — METOPROLOL TARTRATE 50 MG: 50 TABLET, FILM COATED ORAL at 07:01

## 2023-01-24 RX ADMIN — APIXABAN 5 MG: 5 TABLET, FILM COATED ORAL at 08:01

## 2023-01-24 RX ADMIN — MUPIROCIN: 20 OINTMENT TOPICAL at 08:01

## 2023-01-24 RX ADMIN — ASPIRIN 81 MG CHEWABLE TABLET 81 MG: 81 TABLET CHEWABLE at 08:01

## 2023-01-24 RX ADMIN — DONEPEZIL HYDROCHLORIDE 5 MG: 5 TABLET, FILM COATED ORAL at 08:01

## 2023-01-24 RX ADMIN — AMLODIPINE BESYLATE 10 MG: 5 TABLET ORAL at 08:01

## 2023-01-24 RX ADMIN — LOSARTAN POTASSIUM 50 MG: 50 TABLET, FILM COATED ORAL at 08:01

## 2023-01-24 RX ADMIN — METOPROLOL TARTRATE 50 MG: 50 TABLET, FILM COATED ORAL at 08:01

## 2023-01-24 RX ADMIN — MAGNESIUM OXIDE TAB 400 MG (241.3 MG ELEMENTAL MG) 400 MG: 400 (241.3 MG) TAB at 09:01

## 2023-01-24 NOTE — ASSESSMENT & PLAN NOTE
S/p TAVR 2019   TTE 01/23/2023:    There is a Joyner transcutaneously-placed aortic bioprosthesis present. There is trace aortic insufficiency present. 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   The aortic valve mean gradient is 9 mmHg with a dimensionless index of 0.23.

## 2023-01-24 NOTE — NURSING
0830 Report overnight was that pt refused all medications. This morning tele monitor called to report pt HR in 120-130s. MD notified. Morning medications were taken with no reports of problems. 50mg Lopressor given without complaint.     17:21 pt appears asleep, calm with no complaints of pain. Bed in lowest position, bed alarm on for safety, tele sitter monitoring.

## 2023-01-24 NOTE — PROGRESS NOTES
01/23/23 2331   Admission   Initial VN Admission Questions Complete   Pt confused and [place]. Pt unable to answer questions. Pt is a resident of [facility]. Admission questions initiated in ed. Will attempt to complete remainder via chart review

## 2023-01-24 NOTE — PLAN OF CARE
1205  Patient resting quietly in bed when CM rounded. No family present.     1400  Voicemail message left for the pt's daughter, Johana Hurtado ([phone]), requesting a return call to complete the pt's discharge assessment due to pt's hx of dementia. Awaiting response.       [facility] - Telemetry  Initial Discharge Assessment       Primary Care Provider: Tamir Daniels MD    Admission Diagnosis: Atrial fibrillation [I48.91]  Shortness of breath [R06.02]  Atrial fibrillation with rapid ventricular response [I48.91]  Chest pain [R07.9]    Admission Date: 1/22/2023  Expected Discharge Date: 1/26/2023      Payor: HU[employer] MANAGED MEDICARE / Plan: HU[employer] MEDICARE HMO / Product Type: Capitation /     Extended Emergency Contact Information  Primary Emergency Contact: Johana Hurtado  Mobile Phone: [phone]  Relation: Daughter  Secondary Emergency Contact: Linda Olivares  Mobile Phone: [phone]  Relation: Daughter    Discharge Plan A: (P) Return to nursing home  Discharge Plan B: (P) Return to Nursing Home      [address] [place] - [address]  [address]  [place] [address]  Phone: [phone] Fax: [phone]    [facility] - [place] - [address], [address]  [address]  [place] [address]  Phone: [phone] Fax: [phone]      Initial Assessment (most recent)       Adult Discharge Assessment - 01/24/23 1420          Discharge Assessment    Assessment Type Discharge Planning Assessment     Confirmed/corrected address, phone number and insurance Yes     Confirmed Demographics Correct on Facesheet     Source of Information family   daughter, Johana Hurtado ([phone])    Communicated SHENA with patient/caregiver Date not available/Unable to determine (P)      People in Home facility resident (P)    [facility] (4 mths)    Do you expect to return to your current living situation? Yes (P)      Do you have help at home or someone  to help you manage your care at home? Yes (P)      Prior to hospitilization cognitive status: Not Oriented to Place;Not Oriented to Person;Not Oriented to Time (P)      Current cognitive status: Not Oriented to Place;Not Oriented to Person;Not Oriented to Time (P)      Walking or Climbing Stairs transferring difficulty, assistance 1 person;stair climbing difficulty, dependent;ambulation difficulty, assistance 1 person;ambulation difficulty, requires equipment (P)      Dressing/Bathing dressing difficulty, dependent;bathing difficulty, dependent (P)      Equipment Currently Used at Home other (see comments);wheelchair (P)    NH equipment    Readmission within 30 days? No (P)      Patient currently being followed by outpatient case management? No (P)      Do you currently have service(s) that help you manage your care at home? No (P)      Do you take prescription medications? Yes (P)      Do you have prescription coverage? Yes (P)      Do you have any problems affording any of your prescribed medications? No (P)      Is the patient taking medications as prescribed? yes (P)      How do you get to doctors appointments? other (see comments) (P)    followed by NH MDs    Are you on dialysis? No (P)      Do you take coumadin? No (P)      Discharge Plan A Return to nursing home (P)      Discharge Plan B Return to Nursing Home (P)      DME Needed Upon Discharge  none (P)      Discharge Plan discussed with: Adult children (P)                       1420  CM received a return call from the pt's daughter, Johana, to complete the pt's discharge planning assessment. Patient was admitted with a-fib with RVR & is being followed by cards.     Johana stated that the pt has been a resident at the [facility] for the past 4 months, that she would like the pt to return when medically stable, that he uses the NH's equipment, & will need assistance with transportation at time of discharge. Referral sent to the [facility] via  [facility].    GYPSY updated patient's whiteboard with CM name & contact information.     3872  Message sent to Dr Connors informing of the daughter's request for an update from the MD.       Will continue to follow.       Will continue to follow.

## 2023-01-24 NOTE — PLAN OF CARE
89-year-old male with a past medical history of dementia, AFib, hypertension, hyperlipidemia, BPH was admitted for shortness of breath was examined in his bed.  He was calm and not in distress.  He is pleasantly confused, alert and oriented x1.  He is hard of hearing.  He is currently on Cardizem drip for AFib.    [place]: calm, not in distress  CVS: irreg  Lungs: course bs  GI: nt, nd, soft, +bs  Ext: mild edema    * Atrial fibrillation with RVR  Patient with Persistent (7 days or more) atrial fibrillation which is controlled currently with Diltazpam. Patient is currently in atrial fibrillation.[id number] Score: 3. HASBLED Score: 2+. Anticoagulation indicated. Anticoagulation done with Apixaban.   ---------------------------------------------------------  Unclear etiology for what caused pt to go into Afib with RVR. CXR without obvious pneumonia. UA is pending. No diarrhea or GI symptoms.      Plan:  Treat underlying etiology  C/w diliazem drip. Consider transitioning to orals  C/w apixaban  Consult Cardiology     HFrEF      Echo shows ejection fraction of 20%      Patient is on IV due to hypernatremia, infuse with caution      Cardiology following.        Strict input and output      Low-sodium diet     Lacunar stroke  Noted on CT     Plan:  C/w ASA and Apixaban  Hold statin due to slightly elevated LFTs     Hypernatremia  Possible due to poor oral intake.  Initial Na: 149, with subsequent Na; 154 ->149     Plan:  ->s/p 2L of NS in ED  ->continue with D5W at 75cc an hour, lowered from 100cc/hr. 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

## 2023-01-24 NOTE — SUBJECTIVE & OBJECTIVE
Past Medical History:   Diagnosis Date    Hyperlipidemia     Hypertension     Hyperthyroidism        History reviewed. No pertinent surgical history.    Review of patient's allergies indicates:   Allergen Reactions    Ace inhibitors        No current facility-administered medications on file prior to encounter.     Current Outpatient Medications on File Prior to Encounter   Medication Sig    aspirin 81 MG Chew Take 81 mg by mouth once daily.    atorvastatin (LIPITOR) 40 MG tablet Take 40 mg by mouth once daily.    bisacodyL (DULCOLAX) 10 mg Supp Place 10 mg rectally daily as needed.    CALMOSEPTINE 0.44-20.6 % Oint Apply topically daily as needed. Apply to buttocks daily with each shower and as needed for diaper changes    cholecalciferol, vitamin D3, (VITAMIN D3) 50 mcg (2,000 unit) Tab Take 4,000 Units by mouth once daily. Take 2 tablets by mouth daily    donepeziL (ARICEPT) 10 MG tablet Take 10 mg by mouth once daily.    EUTHYROX 50 mcg tablet Take 1 tablet (50 mcg total) by mouth every morning. (Patient taking differently: Take 50 mcg by mouth once daily.)    [] furosemide (LASIX) 20 MG tablet Take 1 tablet (20 mg total) by mouth once daily. (Patient taking differently: Take 20 mg by mouth every Mon, Wed, Fri.)    HEMORRHOIDAL,PE-MIN OIL-CHANDNI, 0.25-14-74.9 % Oint Place 1 applicator rectally 4 (four) times daily as needed.    LORazepam (ATIVAN) 0.5 MG tablet Take 0.5 mg by mouth every 6 (six) hours as needed.    losartan (COZAAR) 25 MG tablet Take 25 mg by mouth once daily.    magnesium oxide (MAG-OX) 400 mg (241.3 mg magnesium) tablet Take 400 mg by mouth every evening.    metoprolol tartrate (LOPRESSOR) 25 MG tablet Take 25 mg by mouth every morning.    polyethylene glycol (GLYCOLAX) 17 gram/dose powder Take 17 g by mouth once daily.    potassium chloride SA (K-DUR,KLOR-CON M) 10 MEQ tablet Take 10 mEq by mouth every morning. Take with Furosemide 20 mg    QUEtiapine (SEROQUEL) 25 MG Tab Take 25 mg by  mouth once daily.    sertraline (ZOLOFT) 50 MG tablet Take 50 mg by mouth once daily.    tamsulosin (FLOMAX) 0.4 mg Cap Take 0.4 mg by mouth once daily.    valproic acid (DEPAKENE) 250 mg capsule Take 250 mg by mouth 2 (two) times daily.    zinc oxide 20 % ointment Apply topically as needed. Apply to affected areas as needed for redness and skin irritation     Family History    None       Tobacco Use    Smoking status: Never    Smokeless tobacco: Never   Substance and Sexual Activity    Alcohol use: Not on file    Drug use: Not on file    Sexual activity: Not on file     Review of Systems   Unable to perform ROS: Dementia   Objective:     Vital Signs (Most Recent):  Temp: 97.6 °F (36.4 °C) (01/24/23 0722)  Pulse: (!) 125 (01/24/23 0902)  Resp: 19 (01/24/23 0722)  BP: 106/88 (01/24/23 0902)  SpO2: 96 % (01/24/23 0722)   Vital Signs (24h Range):  Temp:  [96.1 °F (35.6 °C)-100 °F (37.8 °C)] 97.6 °F (36.4 °C)  Pulse:  [] 125  Resp:  [13-22] 19  SpO2:  [83 %-100 %] 96 %  BP: ()/(58-92) 106/88     Weight: 80.9 kg (178 lb 5.6 oz)  Body mass index is 24.88 kg/m².    SpO2: 96 %         Intake/Output Summary (Last 24 hours) at 1/24/2023 0935  Last data filed at 1/24/2023 0642  Gross per 24 hour   Intake 0 ml   Output 300 ml   Net -300 ml         Lines/Drains/Airways       Drain  Duration                  Urethral Catheter 01/24/23 0106 Double-lumen 16 Fr. <1 day              Peripheral Intravenous Line  Duration                  Peripheral IV - Single Lumen 01/22/23 1929 18 G Right Antecubital 1 day         Peripheral IV - Single Lumen 01/22/23 2230 18 G Left;Posterior Forearm 1 day                    Physical Exam  Constitutional:       General: He is not in acute distress.     Appearance: He is not diaphoretic.   HENT:      Head: Atraumatic.   Eyes:      General:         Right eye: No discharge.         Left eye: No discharge.   Cardiovascular:      Rate and Rhythm: Tachycardia present. Rhythm irregular.       Heart sounds: Murmur heard.   Pulmonary:      Effort: Pulmonary effort is normal.      Breath sounds: No rales.   Abdominal:      General: Bowel sounds are normal.      Palpations: Abdomen is soft.   Musculoskeletal:      Right lower leg: No edema.      Left lower leg: No edema.   Skin:     General: Skin is warm and dry.   Neurological:      Mental Status: He is alert. Mental status is at baseline.       Significant Labs: BMP:   Recent Labs   Lab 01/23/23 1737 01/24/23  0016 01/24/23  0538   * 108 102  102   * 147* 146*  146*   K 3.3* 4.6 4.2  4.2    112* 112*  112*   CO2 21* 24 24  24   BUN 30 30 29  29   CREATININE 1.1 0.9 0.9  0.9   CALCIUM 7.6* 8.3* 8.6*  8.6*   MG  --   --  2.3     , CMP   Recent Labs   Lab 01/22/23 2035 01/23/23 0506 01/23/23 1737 01/24/23  0016 01/24/23  0538   *   < > 135* 147* 146*  146*   K 4.0   < > 3.3* 4.6 4.2  4.2   *   < > 104 112* 112*  112*   CO2 23   < > 21* 24 24  24   *   < > 418* 108 102  102   BUN 45*   < > 30 30 29  29   CREATININE 1.4   < > 1.1 0.9 0.9  0.9   CALCIUM 9.3   < > 7.6* 8.3* 8.6*  8.6*   PROT 6.2  --   --   --   --    ALBUMIN 3.2*  --   --   --   --    BILITOT 1.2*  --   --   --   --    ALKPHOS 94  --   --   --   --    AST 26  --   --   --   --    ALT 46*  --   --   --   --    ANIONGAP 14   < > 10 11 10  10    < > = values in this interval not displayed.     , CBC   Recent Labs   Lab 01/22/23 2035 01/23/23 0506 01/24/23  0538   WBC 15.93* 14.12* 12.16   HGB 14.4 14.1 14.4   HCT 44.4 45.0 45.3    139* 140*     , INR No results for input(s): INR, PROTIME in the last 48 hours., Lipid Panel No results for input(s): CHOL, HDL, LDLCALC, TRIG, CHOLHDL in the last 48 hours., Troponin   Recent Labs   Lab 01/22/23 2035   TROPONINI 0.095*     , and All pertinent lab results from the last 24 hours have been reviewed.    Significant Imaging: Echocardiogram: Transthoracic echo (TTE) complete (Cupid Only):    Results for orders placed or performed during the hospital encounter of 01/22/23   Echo   Result Value Ref Range    BSA 2.01 m2    LA WIDTH 5.30 cm    IVC diameter 2.45 cm    Left Ventricular Outflow Tract Mean Velocity 0.31 cm/s    Left Ventricular Outflow Tract Mean Gradient 0.42 mmHg    PV PEAK VELOCITY 0.71 cm/s    LVIDd 4.97 3.5 - 6.0 cm    IVS 1.35 (A) 0.6 - 1.1 cm    Posterior Wall 1.01 0.6 - 1.1 cm    LVIDs 4.04 (A) 2.1 - 4.0 cm    FS 19 28 - 44 %    LA volume 137.63 cm3    LV mass 226.13 g    LA size 4.62 cm    RVDD 2.92 cm    Left Ventricle Relative Wall Thickness 0.41 cm    AV mean gradient 9 mmHg    AV valve area 0.73 cm2    AV Velocity Ratio 0.23     AV index (prosthetic) 0.23     Pulm vein S/D ratio 1.11     LVOT diameter 2.03 cm    LVOT area 3.2 cm2    LVOT peak steve 0.43 m/s    LVOT peak VTI 6.80 cm    Ao peak steve 1.87 m/s    Ao VTI 30.2 cm    Mr max steve 5.34 m/s    LVOT stroke volume 22.00 cm3    AV peak gradient 14 mmHg    AR Max Steve 2.01 m/s    TR Max Steve 2.30 m/s    PV Peak S Steve 0.39 m/s    PV Peak D Steve 0.35 m/s    LV Systolic Volume 71.75 mL    LV Systolic Volume Index 35.9 mL/m2    LV Diastolic Volume 116.74 mL    LV Diastolic Volume Index 58.37 mL/m2    LA Volume Index 68.8 mL/m2    LV Mass Index 113 g/m2    RA Major Axis 7.82 cm    Left Atrium Minor Axis 5.25 cm    Left Atrium Major Axis 8.93 cm    Triscuspid Valve Regurgitation Peak Gradient 21 mmHg    LA Volume Index (Mod) 99.8 mL/m2    LA volume (mod) 199.68 cm3    RA Width 5.12 cm    Right Atrial Pressure (from IVC) 15 mmHg    EF 20 %    TV rest pulmonary artery pressure 36 mmHg    Narrative    · The left ventricle is normal in size with severely decreased systolic   function.  · The estimated ejection fraction is 20%.  · A diastolic pattern consistent with atrial fibrillation observed.  · There is severe left ventricular global hypokinesis.  · Normal right ventricular size with mildly to moderately reduced right   ventricular systolic  function.  · Severe left atrial enlargement.  · Moderate mitral regurgitation.  · Moderate pulmonic regurgitation.  · There is a Joyner transcutaneously-placed aortic bioprosthesis present.   There is trace aortic insufficiency present. Prosthetic aortic valve is   abnormal.  · The aortic valve mean gradient is 9 mmHg with a dimensionless index of   0.23.  · The estimated PA systolic pressure is 36 mmHg.  · Elevated central venous pressure (15 mmHg).

## 2023-01-24 NOTE — PROGRESS NOTES
[facility]  Cardiology  Progress Note    Patient Name: Yunier Aguillon  MRN: [id number]  Admission Date: 1/22/2023  Hospital Length of Stay: 1 days  Code Status: DNR   Attending Physician: Angella Connors*   Primary Care Physician: Tamir Daniels MD  Expected Discharge Date:   Principal Problem:Atrial fibrillation with RVR    Subjective:     Hospital Course:   01/23/2023 Per HPI   01/24/2023 TTE:   The left ventricle is normal in size with severely decreased systolic function.   The estimated ejection fraction is 20%.   A diastolic pattern consistent with atrial fibrillation observed.   There is severe left ventricular global hypokinesis.   Normal right ventricular size with mildly to moderately reduced right ventricular systolic function.   Severe left atrial enlargement.   Moderate mitral regurgitation.   Moderate pulmonic regurgitation.   There is a Joyner transcutaneously-placed aortic bioprosthesis present. There is trace aortic insufficiency present. 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   The aortic valve mean gradient is 9 mmHg with a dimensionless index of 0.23.   The estimated PA systolic pressure is 36 mmHg.   Elevated central venous pressure (15 mmHg).  Patient remains in AF RVR on tele. BB up titrated, CCB discontinued given depressed LVEF. BP stable.       Past Medical History:   Diagnosis Date    Hyperlipidemia     Hypertension     Hyperthyroidism        History reviewed. No pertinent surgical history.    Review of patient's allergies indicates:   Allergen Reactions    Ace inhibitors        No current facility-administered medications on file prior to encounter.     Current Outpatient Medications on File Prior to Encounter   Medication Sig    aspirin 81 MG Chew Take 81 mg by mouth once daily.    atorvastatin (LIPITOR) 40 MG tablet Take 40 mg by mouth once daily.    bisacodyL (DULCOLAX) 10 mg Supp Place 10 mg rectally daily as needed.    CALMOSEPTINE 0.44-20.6 %  Oint Apply topically daily as needed. Apply to buttocks daily with each shower and as needed for diaper changes    cholecalciferol, vitamin D3, (VITAMIN D3) 50 mcg (2,000 unit) Tab Take 4,000 Units by mouth once daily. Take 2 tablets by mouth daily    donepeziL (ARICEPT) 10 MG tablet Take 10 mg by mouth once daily.    EUTHYROX 50 mcg tablet Take 1 tablet (50 mcg total) by mouth every morning. (Patient taking differently: Take 50 mcg by mouth once daily.)    [] furosemide (LASIX) 20 MG tablet Take 1 tablet (20 mg total) by mouth once daily. (Patient taking differently: Take 20 mg by mouth every Mon, Wed, Fri.)    HEMORRHOIDAL,PE-MIN OIL-CHANDNI, 0.25-14-74.9 % Oint Place 1 applicator rectally 4 (four) times daily as needed.    LORazepam (ATIVAN) 0.5 MG tablet Take 0.5 mg by mouth every 6 (six) hours as needed.    losartan (COZAAR) 25 MG tablet Take 25 mg by mouth once daily.    magnesium oxide (MAG-OX) 400 mg (241.3 mg magnesium) tablet Take 400 mg by mouth every evening.    metoprolol tartrate (LOPRESSOR) 25 MG tablet Take 25 mg by mouth every morning.    polyethylene glycol (GLYCOLAX) 17 gram/dose powder Take 17 g by mouth once daily.    potassium chloride SA (K-DUR,KLOR-CON M) 10 MEQ tablet Take 10 mEq by mouth every morning. Take with Furosemide 20 mg    QUEtiapine (SEROQUEL) 25 MG Tab Take 25 mg by mouth once daily.    sertraline (ZOLOFT) 50 MG tablet Take 50 mg by mouth once daily.    tamsulosin (FLOMAX) 0.4 mg Cap Take 0.4 mg by mouth once daily.    valproic acid (DEPAKENE) 250 mg capsule Take 250 mg by mouth 2 (two) times daily.    zinc oxide 20 % ointment Apply topically as needed. Apply to affected areas as needed for redness and skin irritation     Family History    None       Tobacco Use    Smoking status: Never    Smokeless tobacco: Never   Substance and Sexual Activity    Alcohol use: Not on file    Drug use: Not on file    Sexual activity: Not on file     Review of Systems    Unable to perform ROS: Dementia   Objective:     Vital Signs (Most Recent):  Temp: 97.6 °F (36.4 °C) (01/24/23 0722)  Pulse: (!) 125 (01/24/23 0902)  Resp: 19 (01/24/23 0722)  BP: 106/88 (01/24/23 0902)  SpO2: 96 % (01/24/23 0722)   Vital Signs (24h Range):  Temp:  [96.1 °F (35.6 °C)-100 °F (37.8 °C)] 97.6 °F (36.4 °C)  Pulse:  [] 125  Resp:  [13-22] 19  SpO2:  [83 %-100 %] 96 %  BP: ()/(58-92) 106/88     Weight: 80.9 kg (178 lb 5.6 oz)  Body mass index is 24.88 kg/m².    SpO2: 96 %         Intake/Output Summary (Last 24 hours) at 1/24/2023 0935  Last data filed at 1/24/2023 0642  Gross per 24 hour   Intake 0 ml   Output 300 ml   Net -300 ml         Lines/Drains/Airways       Drain  Duration                  Urethral Catheter 01/24/23 0106 Double-lumen 16 Fr. <1 day              Peripheral Intravenous Line  Duration                  Peripheral IV - Single Lumen 01/22/23 1929 18 G Right Antecubital 1 day         Peripheral IV - Single Lumen 01/22/23 2230 18 G Left;Posterior Forearm 1 day                    Physical Exam  Constitutional:       General: He is not in acute distress.     Appearance: He is not diaphoretic.   HENT:      Head: Atraumatic.   Eyes:      General:         Right eye: No discharge.         Left eye: No discharge.   Cardiovascular:      Rate and Rhythm: Tachycardia present. Rhythm irregular.      Heart sounds: Murmur heard.   Pulmonary:      Effort: Pulmonary effort is normal.      Breath sounds: No rales.   Abdominal:      General: Bowel sounds are normal.      Palpations: Abdomen is soft.   Musculoskeletal:      Right lower leg: No edema.      Left lower leg: No edema.   Skin:     General: Skin is warm and dry.   Neurological:      Mental Status: He is alert. Mental status is at baseline.       Significant Labs: BMP:   Recent Labs   Lab 01/23/23  1737 01/24/23  0016 01/24/23  0538   * 108 102  102   * 147* 146*  146*   K 3.3* 4.6 4.2  4.2    112* 112*  112*    CO2 21* 24 24  24   BUN 30 30 29  29   CREATININE 1.1 0.9 0.9  0.9   CALCIUM 7.6* 8.3* 8.6*  8.6*   MG  --   --  2.3     , CMP   Recent Labs   Lab 01/22/23 2035 01/23/23  0506 01/23/23  1737 01/24/23  0016 01/24/23  0538   *   < > 135* 147* 146*  146*   K 4.0   < > 3.3* 4.6 4.2  4.2   *   < > 104 112* 112*  112*   CO2 23   < > 21* 24 24  24   *   < > 418* 108 102  102   BUN 45*   < > 30 30 29  29   CREATININE 1.4   < > 1.1 0.9 0.9  0.9   CALCIUM 9.3   < > 7.6* 8.3* 8.6*  8.6*   PROT 6.2  --   --   --   --    ALBUMIN 3.2*  --   --   --   --    BILITOT 1.2*  --   --   --   --    ALKPHOS 94  --   --   --   --    AST 26  --   --   --   --    ALT 46*  --   --   --   --    ANIONGAP 14   < > 10 11 10  10    < > = values in this interval not displayed.     , CBC   Recent Labs   Lab 01/22/23 2035 01/23/23 0506 01/24/23  0538   WBC 15.93* 14.12* 12.16   HGB 14.4 14.1 14.4   HCT 44.4 45.0 45.3    139* 140*     , INR No results for input(s): INR, PROTIME in the last 48 hours., Lipid Panel No results for input(s): CHOL, HDL, LDLCALC, TRIG, CHOLHDL in the last 48 hours., Troponin   Recent Labs   Lab 01/22/23 2035   TROPONINI 0.095*     , and All pertinent lab results from the last 24 hours have been reviewed.    Significant Imaging: Echocardiogram: Transthoracic echo (TTE) complete ([facility] Only):   Results for orders placed or performed during the hospital encounter of 01/22/23   Echo   Result Value Ref Range    BSA 2.01 m2    LA WIDTH 5.30 cm    IVC diameter 2.45 cm    Left Ventricular Outflow Tract Mean Velocity 0.31 cm/s    Left Ventricular Outflow Tract Mean Gradient 0.42 mmHg    PV PEAK VELOCITY 0.71 cm/s    LVIDd 4.97 3.5 - 6.0 cm    IVS 1.35 (A) 0.6 - 1.1 cm    Posterior Wall 1.01 0.6 - 1.1 cm    LVIDs 4.04 (A) 2.1 - 4.0 cm    FS 19 28 - 44 %    LA volume 137.63 cm3    LV mass 226.13 g    LA size 4.62 cm    RVDD 2.92 cm    Left Ventricle Relative Wall Thickness 0.41 cm    AV mean  gradient 9 mmHg    AV valve area 0.73 cm2    AV Velocity Ratio 0.23     AV index (prosthetic) 0.23     Pulm vein S/D ratio 1.11     LVOT diameter 2.03 cm    LVOT area 3.2 cm2    LVOT peak steve 0.43 m/s    LVOT peak VTI 6.80 cm    Ao peak steve 1.87 m/s    Ao VTI 30.2 cm    Mr max steve 5.34 m/s    LVOT stroke volume 22.00 cm3    AV peak gradient 14 mmHg    AR Max Steve 2.01 m/s    TR Max Steve 2.30 m/s    PV Peak S Steve 0.39 m/s    PV Peak D Steve 0.35 m/s    LV Systolic Volume 71.75 mL    LV Systolic Volume Index 35.9 mL/m2    LV Diastolic Volume 116.74 mL    LV Diastolic Volume Index 58.37 mL/m2    LA Volume Index 68.8 mL/m2    LV Mass Index 113 g/m2    RA Major Axis 7.82 cm    Left Atrium Minor Axis 5.25 cm    Left Atrium Major Axis 8.93 cm    Triscuspid Valve Regurgitation Peak Gradient 21 mmHg    LA Volume Index (Mod) 99.8 mL/m2    LA volume (mod) 199.68 cm3    RA Width 5.12 cm    Right Atrial Pressure (from IVC) 15 mmHg    EF 20 %    TV rest pulmonary artery pressure 36 mmHg    Narrative    · The left ventricle is normal in size with severely decreased systolic   function.  · The estimated ejection fraction is 20%.  · A diastolic pattern consistent with atrial fibrillation observed.  · There is severe left ventricular global hypokinesis.  · Normal right ventricular size with mildly to moderately reduced right   ventricular systolic function.  · Severe left atrial enlargement.  · Moderate mitral regurgitation.  · Moderate pulmonic regurgitation.  · There is a Joyner transcutaneously-placed aortic bioprosthesis present.   There is trace aortic insufficiency present. Prosthetic aortic valve is   abnormal.  · The aortic valve mean gradient is 9 mmHg with a dimensionless index of   0.23.  · The estimated PA systolic pressure is 36 mmHg.  · Elevated central venous pressure (15 mmHg).        Assessment and Plan:     Brief HPI: Patient seen this morning on rounds, remains in AF. HR 120s, BB up titrated.     * Atrial fibrillation  with RVR  Patient with history of AF  Continue Eliquis, BB- up titrated  TTE :   The left ventricle is normal in size with severely decreased systolic function.   The estimated ejection fraction is 20%.   A diastolic pattern consistent with atrial fibrillation observed.   There is severe left ventricular global hypokinesis.   Normal right ventricular size with mildly to moderately reduced right ventricular systolic function.   Severe left atrial enlargement.   Moderate mitral regurgitation.   Moderate pulmonic regurgitation.   There is a Joyner transcutaneously-placed aortic bioprosthesis present. There is trace aortic insufficiency present. 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   The aortic valve mean gradient is 9 mmHg with a dimensionless index of 0.23.   The estimated PA systolic pressure is 36 mmHg.   Elevated central venous pressure (15 mmHg).  Rate control strategy- goal HR <120 bpm   ? Stress induced RVR in setting of dehydration / infection     Vascular dementia without behavioral disturbance  Stable at baseline per chart    History of transcatheter aortic valve replacement (TAVR)  S/p TAVR 2019   TTE 01/23/2023:    There is a Joyner transcutaneously-placed aortic bioprosthesis present. There is trace aortic insufficiency present. 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   The aortic valve mean gradient is 9 mmHg with a dimensionless index of 0.23.        Coronary artery disease involving coronary bypass graft of native heart without angina pectoris  S/p 3V CABG  On BB, statin, asa, ARB         VTE Risk Mitigation (From admission, onward)         Ordered     apixaban tablet 5 mg  2 times daily         01/23/23 0028     IP VTE HIGH RISK PATIENT  Once         01/23/23 0024     Place sequential compression device  Until discontinued         01/23/23 0024                Stephan Sharif NP  Cardiology  [facility] - Telemetry

## 2023-01-24 NOTE — SUBJECTIVE & OBJECTIVE
Interval History: awake and alert, sitting up in bed with mittens on, reported patient declined meds yesterday.  Tolerating p.o. meds this morning.  Heart rate slowly down trending   Sodium improved  Updated daughter over the phone questions and concerns addressed.  Daughter confirmed DNR as a code status  Appreciate cardiology recs-likely stress-induced RVR in the setting of dehydration and infection.  Continue Eliquis and up titrate beta-blocker        Review of Systems   Unable to perform ROS: Dementia   Objective:     Vital Signs (Most Recent):  Temp: 98.5 °F (36.9 °C) (01/24/23 1141)  Pulse: (!) 118 (01/24/23 1217)  Resp: 18 (01/24/23 1141)  BP: 136/87 (01/24/23 1141)  SpO2: (!) 94 % (01/24/23 1141)   Vital Signs (24h Range):  Temp:  [96.1 °F (35.6 °C)-100 °F (37.8 °C)] 98.5 °F (36.9 °C)  Pulse:  [] 118  Resp:  [15-22] 18  SpO2:  [83 %-100 %] 94 %  BP: ()/(58-92) 136/87     Weight: 80.9 kg (178 lb 5.6 oz)  Body mass index is 24.88 kg/m².    Intake/Output Summary (Last 24 hours) at 1/24/2023 1500  Last data filed at 1/24/2023 0642  Gross per 24 hour   Intake 0 ml   Output 300 ml   Net -300 ml      Physical Exam  Vitals and nursing note reviewed.   Constitutional:       Appearance: He is not ill-appearing.   HENT:      Head: Normocephalic and atraumatic.      Comments: Hard of hearing  Neck:      Vascular: No carotid bruit.   Cardiovascular:      Rate and Rhythm: Tachycardia present. Rhythm irregular.      Heart sounds: No murmur heard.  Pulmonary:      Effort: No respiratory distress.   Abdominal:      Tenderness: There is no abdominal tenderness. There is no guarding.   Genitourinary:     Rectum: Guaiac result negative.   Musculoskeletal:         General: No swelling or deformity.      Cervical back: No tenderness.   Skin:     Findings: No erythema or rash.   Neurological:      Mental Status: He is disoriented.      Sensory: No sensory deficit.      Comments: Unable to communicate       Significant  Labs: A1C: No results for input(s): HGBA1C in the last 4320 hours.  ABGs:   Recent Labs   Lab 01/22/23 2037   PH 7.370   PCO2 37.2   HCO3 21.5*   POCSATURATED 35*   BE -4   PO2 21*     BMP:   Recent Labs   Lab 01/24/23  0538     102   *  146*   K 4.2  4.2   *  112*   CO2 24  24   BUN 29  29   CREATININE 0.9  0.9   CALCIUM 8.6*  8.6*   MG 2.3     CBC:   Recent Labs   Lab 01/22/23 2035 01/23/23  0506 01/24/23  0538   WBC 15.93* 14.12* 12.16   HGB 14.4 14.1 14.4   HCT 44.4 45.0 45.3    139* 140*     CMP:   Recent Labs   Lab 01/22/23 2035 01/23/23  0506 01/23/23  1737 01/24/23  0016 01/24/23  0538   *   < > 135* 147* 146*  146*   K 4.0   < > 3.3* 4.6 4.2  4.2   *   < > 104 112* 112*  112*   CO2 23   < > 21* 24 24  24   *   < > 418* 108 102  102   BUN 45*   < > 30 30 29  29   CREATININE 1.4   < > 1.1 0.9 0.9  0.9   CALCIUM 9.3   < > 7.6* 8.3* 8.6*  8.6*   PROT 6.2  --   --   --   --    ALBUMIN 3.2*  --   --   --   --    BILITOT 1.2*  --   --   --   --    ALKPHOS 94  --   --   --   --    AST 26  --   --   --   --    ALT 46*  --   --   --   --    ANIONGAP 14   < > 10 11 10  10    < > = values in this interval not displayed.     Troponin:   Recent Labs   Lab 01/22/23 2035   TROPONINI 0.095*     TSH: No results for input(s): TSH in the last 4320 hours.  Urine Culture: No results for input(s): LABURIN in the last 48 hours.  Urine Studies:   Recent Labs   Lab 01/23/23  0813   COLORU Yellow   APPEARANCEUA Clear   PHUR 6.0   SPECGRAV 1.025   PROTEINUA 1+*   GLUCUA Negative   KETONESU Negative   BILIRUBINUA Negative   OCCULTUA Negative   NITRITE Negative   UROBILINOGEN Negative   LEUKOCYTESUR Negative   RBCUA 0   WBCUA 1   BACTERIA Occasional   SQUAMEPITHEL 0   HYALINECASTS 1       Significant Imaging: I have reviewed all pertinent imaging results/findings within the past 24 hours.

## 2023-01-24 NOTE — ASSESSMENT & PLAN NOTE
Patient with Persistent (7 days or more) atrial fibrillation which is controlled currently with Diltazpam. Patient is currently in atrial fibrillation.[id number] Score: 3. HASBLED Score: 2+. Anticoagulation indicated. Anticoagulation done with Apixaban.    ---------------------------------------------------------  Unclear etiology for what caused pt to go into Afib with RVR. CXR without obvious pneumonia. UA is pending. No diarrhea or GI symptoms.       Plan:  Treat underlying etiology  C/w diliazem drip. Consider transitioning to orals in AM.  Discontinued  C/w apixaban  Consult Cardiology- appreciate recs continue with Eliquis and up titrate beta-blocker

## 2023-01-24 NOTE — ED NOTES
Report given to MARIANA Nielsen. Patient is in stable condition. Patient on 1L NC 98%, IV fluids and diltiazem infusing to 18 gauge in left hand. Cardiac monitoring in place, HR a fib w/RVR rate @ 90s-110s. NADN.

## 2023-01-24 NOTE — PROGRESS NOTES
89-year-old male with a past medical history of dementia, AFib, hypertension, hyperlipidemia, BPH was admitted for shortness of breath was examined in his bed.  He was calm and not in distress.  He is pleasantly confused, alert and oriented x1.  He is hard of hearing.  He is currently on Cardizem drip for AFib.    Gen [place]: calm, not in distress  CVS: irreg  Lungs: course bs  GI: nt, nd, soft, +bs  Ext: mild edema    * Atrial fibrillation with RVR  Patient with Persistent (7 days or more) atrial fibrillation which is controlled currently with Diltazpam. Patient is currently in atrial fibrillation.[id number] Score: 3. HASBLED Score: 2+. Anticoagulation indicated. Anticoagulation done with Apixaban.   ---------------------------------------------------------  Unclear etiology for what caused pt to go into Afib with RVR. CXR without obvious pneumonia. UA is pending. No diarrhea or GI symptoms.      Plan:  Treat underlying etiology  C/w diliazem drip. Consider transitioning to orals  C/w apixaban  Consult Cardiology     HFrEF      Echo shows ejection fraction of 20%      Patient is on IV due to hypernatremia, infuse with caution      Cardiology following.        Strict input and output      Low-sodium diet     Lacunar stroke  Noted on CT     Plan:  C/w ASA and Apixaban  Hold statin due to slightly elevated LFTs     Hypernatremia  Possible due to poor oral intake.  Initial Na: 149, with subsequent Na; 154 ->149     Plan:  ->s/p 2L of NS in ED  ->continue with D5W at 75cc an hour, lowered from 100cc/hr. 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

## 2023-01-24 NOTE — ASSESSMENT & PLAN NOTE
Patient with history of AF  Continue Eliquis, BB- up titrated  TTE :   The left ventricle is normal in size with severely decreased systolic function.   The estimated ejection fraction is 20%.   A diastolic pattern consistent with atrial fibrillation observed.   There is severe left ventricular global hypokinesis.   Normal right ventricular size with mildly to moderately reduced right ventricular systolic function.   Severe left atrial enlargement.   Moderate mitral regurgitation.   Moderate pulmonic regurgitation.   There is a Joyner transcutaneously-placed aortic bioprosthesis present. There is trace aortic insufficiency present. 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   The aortic valve mean gradient is 9 mmHg with a dimensionless index of 0.23.   The estimated PA systolic pressure is 36 mmHg.   Elevated central venous pressure (15 mmHg).  Rate control strategy- goal HR <120 bpm   ? Stress induced RVR in setting of dehydration / infection

## 2023-01-24 NOTE — ASSESSMENT & PLAN NOTE
Advance Care Planning     Date: 01/24/2023    Code Status  In light of the patients advanced and life limiting illness,I engaged the the family in a conversation about the patient's preferences for care  at the very end of life. The patient wishes to have a natural, peaceful death.  Along those lines, the patient does not wish to have CPR or other invasive treatments performed when his heart and/or breathing stops. I communicated to the family that a DNR order would be placed in his medical record to reflect this preference.  I spent a total of 20 minutes engaging the patient in this advance care planning discussion.       [facility]  I engaged the family in a conversation about advance care planning and we specifically addressed what the goals of care would be moving forward, in light of the patient's change in clinical status, specifically progressive dementia.  We did specifically address the patient's likely prognosis, which is poor.  We explored the patient's values and preferences for future care.  The family endorses that what is most important right now is to focus on quality of life, even if it means sacrificing a little time and improvement in condition but with limits to invasive therapies    Accordingly, we have decided that the best plan to meet the patient's goals includes continuing with treatment    I did not explain the role for hospice care at this stage of the patient's illness, including its ability to help the patient live with the best quality of life possible.  We will not be making a hospice referral.    I spent a total of 25 minutes engaging the patient in this advance care planning discussion.

## 2023-01-24 NOTE — PROGRESS NOTES
[facility] Medicine  Progress Note    Patient Name: Yunier Aguillon  MRN: [id number]  Patient Class: IP- Inpatient   Admission Date: 1/22/2023  Length of Stay: 1 days  Attending Physician: Angella Connors*  Primary Care Provider: Tamir Daniels MD        Subjective:     Principal Problem:Atrial fibrillation with RVR        HPI:  Yunier Aguillon is 89yr old male with PMH of dementia, HLD, HTN, Afib, and BPH who presents with SOB.     Due to dementia and difficulty hearing, the pt wasn't able to give much of a story. Per EMS, pt presented with SOB that began on Sunday evening, with no associated fever, cough, or phlegm production. He was noted to be sating around 85% on RA, while in route. He was also noted to be in Afib with RVR, and he received 10mg of metoprolol. In addition, facility reports that pt has been a bit more combative then normal.     In ED, triage vitals were significant for tachycadia and tachypnea. CBC was significant for leukocytosis. CMP was significant for hypernatremia, elevated glucose, and elevated ALT. BNP was 1,266 and troponin was .095. CT head showed no acute intracranial hemorrhage, with lacunar type infarct in the left side of the qing. CXR showed no focal consolidation, although pt with course interstitial findings.     Pt was started on a dilitazem drip, with some improvement in the RVR     Medicine was consulted for admission for Afib with RVR treatment        Overview/Hospital Course:  No notes on file    Interval History: awake and alert, sitting up in bed with mittens on, reported patient declined meds yesterday.  Tolerating p.o. meds this morning.  Heart rate slowly down trending   Sodium improved  Updated daughter over the phone questions and concerns addressed.  Daughter confirmed DNR as a code status  Appreciate cardiology recs-likely stress-induced RVR in the setting of dehydration and infection.  Continue Eliquis and up titrate  beta-blocker        Review of Systems   Unable to perform ROS: Dementia   Objective:     Vital Signs (Most Recent):  Temp: 98.5 °F (36.9 °C) (01/24/23 1141)  Pulse: (!) 118 (01/24/23 1217)  Resp: 18 (01/24/23 1141)  BP: 136/87 (01/24/23 1141)  SpO2: (!) 94 % (01/24/23 1141)   Vital Signs (24h Range):  Temp:  [96.1 °F (35.6 °C)-100 °F (37.8 °C)] 98.5 °F (36.9 °C)  Pulse:  [] 118  Resp:  [15-22] 18  SpO2:  [83 %-100 %] 94 %  BP: ()/(58-92) 136/87     Weight: 80.9 kg (178 lb 5.6 oz)  Body mass index is 24.88 kg/m².    Intake/Output Summary (Last 24 hours) at 1/24/2023 1500  Last data filed at 1/24/2023 0642  Gross per 24 hour   Intake 0 ml   Output 300 ml   Net -300 ml      Physical Exam  Vitals and nursing note reviewed.   Constitutional:       Appearance: He is not ill-appearing.   HENT:      Head: Normocephalic and atraumatic.      Comments: Hard of hearing  Neck:      Vascular: No carotid bruit.   Cardiovascular:      Rate and Rhythm: Tachycardia present. Rhythm irregular.      Heart sounds: No murmur heard.  Pulmonary:      Effort: No respiratory distress.   Abdominal:      Tenderness: There is no abdominal tenderness. There is no guarding.   Genitourinary:     Rectum: Guaiac result negative.   Musculoskeletal:         General: No swelling or deformity.      Cervical back: No tenderness.   Skin:     Findings: No erythema or rash.   Neurological:      Mental Status: He is disoriented.      Sensory: No sensory deficit.      Comments: Unable to communicate       Significant Labs: A1C: No results for input(s): HGBA1C in the last 4320 hours.  ABGs:   Recent Labs   Lab 01/22/23 2037   PH 7.370   PCO2 37.2   HCO3 21.5*   POCSATURATED 35*   BE -4   PO2 21*     BMP:   Recent Labs   Lab 01/24/23  0538     102   *  146*   K 4.2  4.2   *  112*   CO2 24  24   BUN 29  29   CREATININE 0.9  0.9   CALCIUM 8.6*  8.6*   MG 2.3     CBC:   Recent Labs   Lab 01/22/23  2035 01/23/23  7977  01/24/23  0538   WBC 15.93* 14.12* 12.16   HGB 14.4 14.1 14.4   HCT 44.4 45.0 45.3    139* 140*     CMP:   Recent Labs   Lab 01/22/23 2035 01/23/23  0506 01/23/23  1737 01/24/23  0016 01/24/23  0538   *   < > 135* 147* 146*  146*   K 4.0   < > 3.3* 4.6 4.2  4.2   *   < > 104 112* 112*  112*   CO2 23   < > 21* 24 24  24   *   < > 418* 108 102  102   BUN 45*   < > 30 30 29  29   CREATININE 1.4   < > 1.1 0.9 0.9  0.9   CALCIUM 9.3   < > 7.6* 8.3* 8.6*  8.6*   PROT 6.2  --   --   --   --    ALBUMIN 3.2*  --   --   --   --    BILITOT 1.2*  --   --   --   --    ALKPHOS 94  --   --   --   --    AST 26  --   --   --   --    ALT 46*  --   --   --   --    ANIONGAP 14   < > 10 11 10  10    < > = values in this interval not displayed.     Troponin:   Recent Labs   Lab 01/22/23 2035   TROPONINI 0.095*     TSH: No results for input(s): TSH in the last 4320 hours.  Urine Culture: No results for input(s): LABURIN in the last 48 hours.  Urine Studies:   Recent Labs   Lab 01/23/23  0813   COLORU Yellow   APPEARANCEUA Clear   PHUR 6.0   SPECGRAV 1.025   PROTEINUA 1+*   GLUCUA Negative   KETONESU Negative   BILIRUBINUA Negative   OCCULTUA Negative   NITRITE Negative   UROBILINOGEN Negative   LEUKOCYTESUR Negative   RBCUA 0   WBCUA 1   BACTERIA Occasional   SQUAMEPITHEL 0   HYALINECASTS 1       Significant Imaging: I have reviewed all pertinent imaging results/findings within the past 24 hours.      Assessment/Plan:      * Atrial fibrillation with RVR  Patient with Persistent (7 days or more) atrial fibrillation which is controlled currently with Diltazpam. Patient is currently in atrial fibrillation.[id number] Score: 3. HASBLED Score: 2+. Anticoagulation indicated. Anticoagulation done with Apixaban.    ---------------------------------------------------------  Unclear etiology for what caused pt to go into Afib with RVR. CXR without obvious pneumonia. UA is pending. No diarrhea or GI symptoms.        Plan:  Treat underlying etiology  C/w diliazem drip. Consider transitioning to orals in AM.  Discontinued  C/w apixaban  Consult Cardiology- appreciate recs continue with Eliquis and up titrate beta-blocker        Advance care planning    Advance Care Planning     Date: 01/24/2023    Code Status  In light of the patients advanced and life limiting illness,I engaged the the family in a conversation about the patient's preferences for care  at the very end of life. The patient wishes to have a natural, peaceful death.  Along those lines, the patient does not wish to have CPR or other invasive treatments performed when his heart and/or breathing stops. I communicated to the family that a DNR order would be placed in his medical record to reflect this preference.  I spent a total of 20 minutes engaging the patient in this advance care planning discussion.       [facility]  I engaged the family in a conversation about advance care planning and we specifically addressed what the goals of care would be moving forward, in light of the patient's change in clinical status, specifically progressive dementia.  We did specifically address the patient's likely prognosis, which is poor.  We explored the patient's values and preferences for future care.  The family endorses that what is most important right now is to focus on quality of life, even if it means sacrificing a little time and improvement in condition but with limits to invasive therapies    Accordingly, we have decided that the best plan to meet the patient's goals includes continuing with treatment    I did not explain the role for hospice care at this stage of the patient's illness, including its ability to help the patient live with the best quality of life possible.  We will not be making a hospice referral.    I spent a total of 25 minutes engaging the patient in this advance care planning discussion.             Lacunar stroke  Noted on CT    Plan:  C/w ASA and  Apixaban  Hold statin due to slightly elevated LFTs      Hypernatremia  Possible due to poor oral intake.    Initial Na: 149, with subsequent Na; 154     Plan:  ->s/p 2L of NS in ED  ->start D5W at 100cc an hour  ->BMP every 6 hours  ->consider nephrology consult      Vascular dementia without behavioral disturbance  C/w home donepezil 5mg daily      Hypothyroidism, unspecified  C/w synthroid 50mcg dose      Hyperlipidemia  Hold statin due to slighted elevated LFTs      Hypertension  C/w home amlodipine 10mg   C/w home losartan 50mg        VTE Risk Mitigation (From admission, onward)         Ordered     apixaban tablet 5 mg  2 times daily         01/23/23 0028     IP VTE HIGH RISK PATIENT  Once         01/23/23 0024     Place sequential compression device  Until discontinued         01/23/23 0024                Discharge Planning   SHENA: 1/26/2023     Code Status: DNR   Is the patient medically ready for discharge?:     Reason for patient still in hospital (select all that apply): Patient trending condition  Discharge Plan A: Return to nursing home                  Angella Connors MD  Department of Hospital Medicine   [facility] - Telemetry

## 2023-01-25 LAB
ANION GAP SERPL CALC-SCNC: 10 MMOL/L (ref 8–16)
ANION GAP SERPL CALC-SCNC: 11 MMOL/L (ref 8–16)
ANION GAP SERPL CALC-SCNC: 7 MMOL/L (ref 8–16)
ANION GAP SERPL CALC-SCNC: 8 MMOL/L (ref 8–16)
ANION GAP SERPL CALC-SCNC: 8 MMOL/L (ref 8–16)
BASOPHILS # BLD AUTO: 0.04 K/UL (ref 0–0.2)
BASOPHILS NFR BLD: 0.4 % (ref 0–1.9)
BUN SERPL-MCNC: 23 MG/DL (ref 10–30)
BUN SERPL-MCNC: 25 MG/DL (ref 10–30)
BUN SERPL-MCNC: 26 MG/DL (ref 10–30)
BUN SERPL-MCNC: 26 MG/DL (ref 10–30)
BUN SERPL-MCNC: 27 MG/DL (ref 10–30)
CALCIUM SERPL-MCNC: 8.3 MG/DL (ref 8.7–10.5)
CALCIUM SERPL-MCNC: 8.4 MG/DL (ref 8.7–10.5)
CALCIUM SERPL-MCNC: 8.5 MG/DL (ref 8.7–10.5)
CALCIUM SERPL-MCNC: 8.7 MG/DL (ref 8.7–10.5)
CALCIUM SERPL-MCNC: 8.7 MG/DL (ref 8.7–10.5)
CHLORIDE SERPL-SCNC: 111 MMOL/L (ref 95–110)
CHLORIDE SERPL-SCNC: 111 MMOL/L (ref 95–110)
CHLORIDE SERPL-SCNC: 112 MMOL/L (ref 95–110)
CHLORIDE SERPL-SCNC: 113 MMOL/L (ref 95–110)
CHLORIDE SERPL-SCNC: 115 MMOL/L (ref 95–110)
CO2 SERPL-SCNC: 22 MMOL/L (ref 23–29)
CO2 SERPL-SCNC: 25 MMOL/L (ref 23–29)
CO2 SERPL-SCNC: 26 MMOL/L (ref 23–29)
CO2 SERPL-SCNC: 27 MMOL/L (ref 23–29)
CO2 SERPL-SCNC: 27 MMOL/L (ref 23–29)
CREAT SERPL-MCNC: 0.8 MG/DL (ref 0.5–1.4)
CREAT SERPL-MCNC: 0.8 MG/DL (ref 0.5–1.4)
CREAT SERPL-MCNC: 0.9 MG/DL (ref 0.5–1.4)
DIFFERENTIAL METHOD: ABNORMAL
EOSINOPHIL # BLD AUTO: 0.2 K/UL (ref 0–0.5)
EOSINOPHIL NFR BLD: 1.9 % (ref 0–8)
ERYTHROCYTE [DISTWIDTH] IN BLOOD BY AUTOMATED COUNT: 16.6 % (ref 11.5–14.5)
EST. GFR  (NO RACE VARIABLE): >60 ML/MIN/1.73 M^2
GLUCOSE SERPL-MCNC: 111 MG/DL (ref 70–110)
GLUCOSE SERPL-MCNC: 67 MG/DL (ref 70–110)
GLUCOSE SERPL-MCNC: 93 MG/DL (ref 70–110)
GLUCOSE SERPL-MCNC: 93 MG/DL (ref 70–110)
GLUCOSE SERPL-MCNC: 99 MG/DL (ref 70–110)
HCT VFR BLD AUTO: 46.8 % (ref 40–54)
HGB BLD-MCNC: 14.7 G/DL (ref 14–18)
IMM GRANULOCYTES # BLD AUTO: 0.07 K/UL (ref 0–0.04)
IMM GRANULOCYTES NFR BLD AUTO: 0.6 % (ref 0–0.5)
LYMPHOCYTES # BLD AUTO: 0.8 K/UL (ref 1–4.8)
LYMPHOCYTES NFR BLD: 7.1 % (ref 18–48)
MCH RBC QN AUTO: 29.2 PG (ref 27–31)
MCHC RBC AUTO-ENTMCNC: 31.4 G/DL (ref 32–36)
MCV RBC AUTO: 93 FL (ref 82–98)
MONOCYTES # BLD AUTO: 0.8 K/UL (ref 0.3–1)
MONOCYTES NFR BLD: 7.2 % (ref 4–15)
NEUTROPHILS # BLD AUTO: 9.3 K/UL (ref 1.8–7.7)
NEUTROPHILS NFR BLD: 82.8 % (ref 38–73)
NRBC BLD-RTO: 0 /100 WBC
PLATELET # BLD AUTO: 163 K/UL (ref 150–450)
PMV BLD AUTO: 10.5 FL (ref 9.2–12.9)
POCT GLUCOSE: 90 MG/DL (ref 70–110)
POTASSIUM SERPL-SCNC: 3.5 MMOL/L (ref 3.5–5.1)
POTASSIUM SERPL-SCNC: 3.8 MMOL/L (ref 3.5–5.1)
POTASSIUM SERPL-SCNC: 4 MMOL/L (ref 3.5–5.1)
POTASSIUM SERPL-SCNC: 4 MMOL/L (ref 3.5–5.1)
POTASSIUM SERPL-SCNC: 4.1 MMOL/L (ref 3.5–5.1)
RBC # BLD AUTO: 5.03 M/UL (ref 4.6–6.2)
SODIUM SERPL-SCNC: 146 MMOL/L (ref 136–145)
SODIUM SERPL-SCNC: 147 MMOL/L (ref 136–145)
SODIUM SERPL-SCNC: 148 MMOL/L (ref 136–145)
WBC # BLD AUTO: 11.16 K/UL (ref 3.9–12.7)

## 2023-01-25 PROCEDURE — 11000001 HC ACUTE MED/SURG PRIVATE ROOM

## 2023-01-25 PROCEDURE — 36415 COLL VENOUS BLD VENIPUNCTURE: CPT | Performed by: STUDENT IN AN ORGANIZED HEALTH CARE EDUCATION/TRAINING PROGRAM

## 2023-01-25 PROCEDURE — 99900035 HC TECH TIME PER 15 MIN (STAT)

## 2023-01-25 PROCEDURE — 25000003 PHARM REV CODE 250: Performed by: STUDENT IN AN ORGANIZED HEALTH CARE EDUCATION/TRAINING PROGRAM

## 2023-01-25 PROCEDURE — 85025 COMPLETE CBC W/AUTO DIFF WBC: CPT | Performed by: STUDENT IN AN ORGANIZED HEALTH CARE EDUCATION/TRAINING PROGRAM

## 2023-01-25 PROCEDURE — 25000003 PHARM REV CODE 250: Performed by: FAMILY MEDICINE

## 2023-01-25 PROCEDURE — 25000003 PHARM REV CODE 250: Performed by: INTERNAL MEDICINE

## 2023-01-25 PROCEDURE — 94761 N-INVAS EAR/PLS OXIMETRY MLT: CPT

## 2023-01-25 PROCEDURE — 80048 BASIC METABOLIC PNL TOTAL CA: CPT | Performed by: STUDENT IN AN ORGANIZED HEALTH CARE EDUCATION/TRAINING PROGRAM

## 2023-01-25 RX ORDER — METOPROLOL TARTRATE 50 MG/1
50 TABLET ORAL 3 TIMES DAILY
Status: DISCONTINUED | OUTPATIENT
Start: 2023-01-25 | End: 2023-01-27 | Stop reason: HOSPADM

## 2023-01-25 RX ADMIN — MUPIROCIN: 20 OINTMENT TOPICAL at 08:01

## 2023-01-25 RX ADMIN — METOPROLOL TARTRATE 50 MG: 50 TABLET, FILM COATED ORAL at 08:01

## 2023-01-25 RX ADMIN — DONEPEZIL HYDROCHLORIDE 5 MG: 5 TABLET, FILM COATED ORAL at 08:01

## 2023-01-25 RX ADMIN — APIXABAN 5 MG: 5 TABLET, FILM COATED ORAL at 08:01

## 2023-01-25 RX ADMIN — MUPIROCIN: 20 OINTMENT TOPICAL at 09:01

## 2023-01-25 RX ADMIN — MAGNESIUM OXIDE TAB 400 MG (241.3 MG ELEMENTAL MG) 400 MG: 400 (241.3 MG) TAB at 09:01

## 2023-01-25 RX ADMIN — ASPIRIN 81 MG CHEWABLE TABLET 81 MG: 81 TABLET CHEWABLE at 08:01

## 2023-01-25 RX ADMIN — METOPROLOL TARTRATE 50 MG: 50 TABLET, FILM COATED ORAL at 03:01

## 2023-01-25 RX ADMIN — LEVOTHYROXINE SODIUM 50 MCG: 50 TABLET ORAL at 05:01

## 2023-01-25 NOTE — NURSING
Pt HR in 130's. Notified MD, instructed to give Lopressor 50mg now. Pt took medication crushed in pudding.

## 2023-01-25 NOTE — PROGRESS NOTES
[facility] Medicine  Progress Note    Patient Name: Yunier Aguillon  MRN: [id number]  Patient Class: IP- Inpatient   Admission Date: 1/22/2023  Length of Stay: 2 days  Attending Physician: Angella Connors*  Primary Care Provider: Tamir Daniels MD        Subjective:     Principal Problem:Atrial fibrillation with RVR        HPI:  Yunier Aguillon is 89yr old male with PMH of dementia, HLD, HTN, Afib, and BPH who presents with SOB.     Due to dementia and difficulty hearing, the pt wasn't able to give much of a story. Per EMS, pt presented with SOB that began on Sunday evening, with no associated fever, cough, or phlegm production. He was noted to be sating around 85% on RA, while in route. He was also noted to be in Afib with RVR, and he received 10mg of metoprolol. In addition, facility reports that pt has been a bit more combative then normal.     In ED, triage vitals were significant for tachycadia and tachypnea. CBC was significant for leukocytosis. CMP was significant for hypernatremia, elevated glucose, and elevated ALT. BNP was 1,266 and troponin was .095. CT head showed no acute intracranial hemorrhage, with lacunar type infarct in the left side of the qing. CXR showed no focal consolidation, although pt with course interstitial findings.     Pt was started on a dilitazem drip, with some improvement in the RVR     Medicine was consulted for admission for Afib with RVR treatment        Overview/Hospital Course:  No notes on file    Interval History: drowsy,  heart rate slowly improving    Sodium improved  Appreciate cardiology recs-likely stress-induced RVR in the setting of dehydration and infection.  Continue Eliquis and beta-blocker        Review of Systems   Unable to perform ROS: Dementia   Objective:     Vital Signs (Most Recent):  Temp: 96.1 °F (35.6 °C) (01/25/23 1058)  Pulse: 92 (01/25/23 1207)  Resp: (!) 22 (01/25/23 1058)  BP: 108/73 (01/25/23 1058)  SpO2: 95 % (01/25/23  1058)   Vital Signs (24h Range):  Temp:  [96.1 °F (35.6 °C)-98.5 °F (36.9 °C)] 96.1 °F (35.6 °C)  Pulse:  [] 92  Resp:  [16-22] 22  SpO2:  [95 %-99 %] 95 %  BP: (108-170)/(73-96) 108/73     Weight: 80.9 kg (178 lb 5.6 oz)  Body mass index is 24.88 kg/m².    Intake/Output Summary (Last 24 hours) at 1/25/2023 1436  Last data filed at 1/24/2023 1852  Gross per 24 hour   Intake --   Output 400 ml   Net -400 ml        Physical Exam  Vitals and nursing note reviewed.   Constitutional:       Appearance: He is not ill-appearing.   HENT:      Head: Normocephalic and atraumatic.      Comments: Hard of hearing  Neck:      Vascular: No carotid bruit.   Cardiovascular:      Rate and Rhythm: Normal rate. Rhythm irregular.      Heart sounds: No murmur heard.  Pulmonary:      Effort: No respiratory distress.   Abdominal:      Tenderness: There is no abdominal tenderness. There is no guarding.   Genitourinary:     Rectum: Guaiac result negative.   Musculoskeletal:         General: No swelling or deformity.      Cervical back: No tenderness.   Skin:     Findings: No erythema or rash.   Neurological:      Mental Status: He is disoriented.      Sensory: No sensory deficit.      Comments: Unable to communicate       Significant Labs: A1C: No results for input(s): HGBA1C in the last 4320 hours.  ABGs:   No results for input(s): PH, PCO2, HCO3, POCSATURATED, BE, TOTALHB, COHB, METHB, O2HB, POCFIO2, PO2 in the last 48 hours.    BMP:   Recent Labs   Lab 01/24/23  0538 01/24/23  1240 01/25/23  1142     102   < > 111*   *  146*   < > 148*   K 4.2  4.2   < > 3.5   *  112*   < > 115*   CO2 24  24   < > 26   BUN 29  29   < > 25   CREATININE 0.9  0.9   < > 0.8   CALCIUM 8.6*  8.6*   < > 8.4*   MG 2.3  --   --     < > = values in this interval not displayed.       CBC:   Recent Labs   Lab 01/24/23  0538 01/25/23  0555   WBC 12.16 11.16   HGB 14.4 14.7   HCT 45.3 46.8   * 163       CMP:   Recent Labs   Lab  01/24/23  2355 01/25/23  0555 01/25/23  1142   * 146*  146* 148*   K 3.8 4.0  4.0 3.5   * 111*  111* 115*   CO2 25 27  27 26   GLU 99 93  93 111*   BUN 27 26  26 25   CREATININE 0.9 0.9  0.9 0.8   CALCIUM 8.5* 8.7  8.7 8.4*   ANIONGAP 10 8  8 7*       Troponin:   No results for input(s): TROPONINI, TROPONINIHS in the last 48 hours.    TSH: No results for input(s): TSH in the last 4320 hours.  Urine Culture: No results for input(s): LABURIN in the last 48 hours.  Urine Studies:   No results for input(s): COLORU, APPEARANCEUA, PHUR, SPECGRAV, PROTEINUA, GLUCUA, KETONESU, BILIRUBINUA, OCCULTUA, NITRITE, UROBILINOGEN, LEUKOCYTESUR, RBCUA, WBCUA, BACTERIA, SQUAMEPITHEL, HYALINECASTS in the last 48 hours.    Invalid input(s): WRIGHTSUR      Significant Imaging: I have reviewed all pertinent imaging results/findings within the past 24 hours.      Assessment/Plan:      * Atrial fibrillation with RVR  Patient with Persistent (7 days or more) atrial fibrillation which is controlled currently with Diltazpam. Patient is currently in atrial fibrillation.[id number] Score: 3. HASBLED Score: 2+. Anticoagulation indicated. Anticoagulation done with Apixaban.    ---------------------------------------------------------  Unclear etiology for what caused pt to go into Afib with RVR. CXR without obvious pneumonia. UA is pending. No diarrhea or GI symptoms.       Plan:  Treat underlying etiology  C/w diliazem drip. Consider transitioning to orals in AM.  Discontinued  C/w apixaban  Consult Cardiology- appreciate recs continue with Eliquis and up titrate beta-blocker        Advance care planning    Advance Care Planning     Date: 01/24/2023    Code Status  In light of the patients advanced and life limiting illness,I engaged the the family in a conversation about the patient's preferences for care  at the very end of life. The patient wishes to have a natural, peaceful death.  Along those lines, the patient does not  wish to have CPR or other invasive treatments performed when his heart and/or breathing stops. I communicated to the family that a DNR order would be placed in his medical record to reflect this preference.  I spent a total of 20 minutes engaging the patient in this advance care planning discussion.       [facility]  I engaged the family in a conversation about advance care planning and we specifically addressed what the goals of care would be moving forward, in light of the patient's change in clinical status, specifically progressive dementia.  We did specifically address the patient's likely prognosis, which is poor.  We explored the patient's values and preferences for future care.  The family endorses that what is most important right now is to focus on quality of life, even if it means sacrificing a little time and improvement in condition but with limits to invasive therapies    Accordingly, we have decided that the best plan to meet the patient's goals includes continuing with treatment    I did not explain the role for hospice care at this stage of the patient's illness, including its ability to help the patient live with the best quality of life possible.  We will not be making a hospice referral.    I spent a total of 25 minutes engaging the patient in this advance care planning discussion.             Lacunar stroke  Noted on CT    Plan:  C/w ASA and Apixaban  Hold statin due to slightly elevated LFTs      Hypernatremia  Possible due to poor oral intake.    Initial Na: 149, with subsequent Na; 154     Plan:  ->s/p 2L of NS in ED  ->start D5W at 100cc an hour  ->BMP every 6 hours  ->consider nephrology consult      Vascular dementia without behavioral disturbance  C/w home donepezil 5mg daily      Hypothyroidism, unspecified  C/w synthroid 50mcg dose      Hyperlipidemia  Hold statin due to slighted elevated LFTs      Hypertension  C/w home amlodipine 10mg   C/w home losartan 50mg        VTE Risk Mitigation (From  admission, onward)         Ordered     apixaban tablet 5 mg  2 times daily         01/23/23 0028     IP VTE HIGH RISK PATIENT  Once         01/23/23 0024     Place sequential compression device  Until discontinued         01/23/23 0024                Discharge Planning   SHENA: 1/26/2023     Code Status: DNR   Is the patient medically ready for discharge?:     Reason for patient still in hospital (select all that apply): Patient trending condition  Discharge Plan A: Return to nursing home                  Angella Connors MD  Department of [facility] Medicine   [facility]

## 2023-01-25 NOTE — SUBJECTIVE & OBJECTIVE
Interval History: drowsy,  heart rate slowly improving    Sodium improved  Appreciate cardiology recs-likely stress-induced RVR in the setting of dehydration and infection.  Continue Eliquis and beta-blocker        Review of Systems   Unable to perform ROS: Dementia   Objective:     Vital Signs (Most Recent):  Temp: 96.1 °F (35.6 °C) (01/25/23 1058)  Pulse: 92 (01/25/23 1207)  Resp: (!) 22 (01/25/23 1058)  BP: 108/73 (01/25/23 1058)  SpO2: 95 % (01/25/23 1058)   Vital Signs (24h Range):  Temp:  [96.1 °F (35.6 °C)-98.5 °F (36.9 °C)] 96.1 °F (35.6 °C)  Pulse:  [] 92  Resp:  [16-22] 22  SpO2:  [95 %-99 %] 95 %  BP: (108-170)/(73-96) 108/73     Weight: 80.9 kg (178 lb 5.6 oz)  Body mass index is 24.88 kg/m².    Intake/Output Summary (Last 24 hours) at 1/25/2023 1436  Last data filed at 1/24/2023 1852  Gross per 24 hour   Intake --   Output 400 ml   Net -400 ml        Physical Exam  Vitals and nursing note reviewed.   Constitutional:       Appearance: He is not ill-appearing.   HENT:      Head: Normocephalic and atraumatic.      Comments: Hard of hearing  Neck:      Vascular: No carotid bruit.   Cardiovascular:      Rate and Rhythm: Normal rate. Rhythm irregular.      Heart sounds: No murmur heard.  Pulmonary:      Effort: No respiratory distress.   Abdominal:      Tenderness: There is no abdominal tenderness. There is no guarding.   Genitourinary:     Rectum: Guaiac result negative.   Musculoskeletal:         General: No swelling or deformity.      Cervical back: No tenderness.   Skin:     Findings: No erythema or rash.   Neurological:      Mental Status: He is disoriented.      Sensory: No sensory deficit.      Comments: Unable to communicate       Significant Labs: A1C: No results for input(s): HGBA1C in the last 4320 hours.  ABGs:   No results for input(s): PH, PCO2, HCO3, POCSATURATED, BE, TOTALHB, COHB, METHB, O2HB, POCFIO2, PO2 in the last 48 hours.    BMP:   Recent Labs   Lab 01/24/23  0538 01/24/23  1245  01/25/23  1142     102   < > 111*   *  146*   < > 148*   K 4.2  4.2   < > 3.5   *  112*   < > 115*   CO2 24  24   < > 26   BUN 29  29   < > 25   CREATININE 0.9  0.9   < > 0.8   CALCIUM 8.6*  8.6*   < > 8.4*   MG 2.3  --   --     < > = values in this interval not displayed.       CBC:   Recent Labs   Lab 01/24/23  0538 01/25/23  0555   WBC 12.16 11.16   HGB 14.4 14.7   HCT 45.3 46.8   * 163       CMP:   Recent Labs   Lab 01/24/23  2355 01/25/23  0555 01/25/23  1142   * 146*  146* 148*   K 3.8 4.0  4.0 3.5   * 111*  111* 115*   CO2 25 27  27 26   GLU 99 93  93 111*   BUN 27 26  26 25   CREATININE 0.9 0.9  0.9 0.8   CALCIUM 8.5* 8.7  8.7 8.4*   ANIONGAP 10 8  8 7*       Troponin:   No results for input(s): TROPONINI, TROPONINIHS in the last 48 hours.    TSH: No results for input(s): TSH in the last 4320 hours.  Urine Culture: No results for input(s): LABURIN in the last 48 hours.  Urine Studies:   No results for input(s): COLORU, APPEARANCEUA, PHUR, SPECGRAV, PROTEINUA, GLUCUA, KETONESU, BILIRUBINUA, OCCULTUA, NITRITE, UROBILINOGEN, LEUKOCYTESUR, RBCUA, WBCUA, BACTERIA, SQUAMEPITHEL, HYALINECASTS in the last 48 hours.    Invalid input(s): WRIGHTSUR      Significant Imaging: I have reviewed all pertinent imaging results/findings within the past 24 hours.

## 2023-01-26 LAB
ANION GAP SERPL CALC-SCNC: 10 MMOL/L (ref 8–16)
ANION GAP SERPL CALC-SCNC: 11 MMOL/L (ref 8–16)
BUN SERPL-MCNC: 22 MG/DL (ref 10–30)
BUN SERPL-MCNC: 23 MG/DL (ref 10–30)
BUN SERPL-MCNC: 23 MG/DL (ref 10–30)
BUN SERPL-MCNC: 24 MG/DL (ref 10–30)
CALCIUM SERPL-MCNC: 8.5 MG/DL (ref 8.7–10.5)
CALCIUM SERPL-MCNC: 8.6 MG/DL (ref 8.7–10.5)
CALCIUM SERPL-MCNC: 8.6 MG/DL (ref 8.7–10.5)
CALCIUM SERPL-MCNC: 8.8 MG/DL (ref 8.7–10.5)
CHLORIDE SERPL-SCNC: 111 MMOL/L (ref 95–110)
CHLORIDE SERPL-SCNC: 112 MMOL/L (ref 95–110)
CO2 SERPL-SCNC: 23 MMOL/L (ref 23–29)
CO2 SERPL-SCNC: 23 MMOL/L (ref 23–29)
CO2 SERPL-SCNC: 25 MMOL/L (ref 23–29)
CO2 SERPL-SCNC: 25 MMOL/L (ref 23–29)
CREAT SERPL-MCNC: 0.8 MG/DL (ref 0.5–1.4)
CREAT SERPL-MCNC: 0.9 MG/DL (ref 0.5–1.4)
EST. GFR  (NO RACE VARIABLE): >60 ML/MIN/1.73 M^2
GLUCOSE SERPL-MCNC: 162 MG/DL (ref 70–110)
GLUCOSE SERPL-MCNC: 91 MG/DL (ref 70–110)
GLUCOSE SERPL-MCNC: 93 MG/DL (ref 70–110)
GLUCOSE SERPL-MCNC: 94 MG/DL (ref 70–110)
POCT GLUCOSE: 125 MG/DL (ref 70–110)
POCT GLUCOSE: 147 MG/DL (ref 70–110)
POCT GLUCOSE: 185 MG/DL (ref 70–110)
POTASSIUM SERPL-SCNC: 3.7 MMOL/L (ref 3.5–5.1)
POTASSIUM SERPL-SCNC: 3.9 MMOL/L (ref 3.5–5.1)
POTASSIUM SERPL-SCNC: 4.2 MMOL/L (ref 3.5–5.1)
POTASSIUM SERPL-SCNC: 4.2 MMOL/L (ref 3.5–5.1)
SODIUM SERPL-SCNC: 145 MMOL/L (ref 136–145)
SODIUM SERPL-SCNC: 146 MMOL/L (ref 136–145)
SODIUM SERPL-SCNC: 146 MMOL/L (ref 136–145)
SODIUM SERPL-SCNC: 147 MMOL/L (ref 136–145)

## 2023-01-26 PROCEDURE — 99900035 HC TECH TIME PER 15 MIN (STAT)

## 2023-01-26 PROCEDURE — 25000003 PHARM REV CODE 250: Performed by: FAMILY MEDICINE

## 2023-01-26 PROCEDURE — 80048 BASIC METABOLIC PNL TOTAL CA: CPT | Performed by: STUDENT IN AN ORGANIZED HEALTH CARE EDUCATION/TRAINING PROGRAM

## 2023-01-26 PROCEDURE — 36415 COLL VENOUS BLD VENIPUNCTURE: CPT | Performed by: STUDENT IN AN ORGANIZED HEALTH CARE EDUCATION/TRAINING PROGRAM

## 2023-01-26 PROCEDURE — 25000003 PHARM REV CODE 250: Performed by: STUDENT IN AN ORGANIZED HEALTH CARE EDUCATION/TRAINING PROGRAM

## 2023-01-26 PROCEDURE — 80048 BASIC METABOLIC PNL TOTAL CA: CPT | Mod: 91 | Performed by: STUDENT IN AN ORGANIZED HEALTH CARE EDUCATION/TRAINING PROGRAM

## 2023-01-26 PROCEDURE — 11000001 HC ACUTE MED/SURG PRIVATE ROOM

## 2023-01-26 PROCEDURE — 94761 N-INVAS EAR/PLS OXIMETRY MLT: CPT

## 2023-01-26 RX ORDER — METOPROLOL TARTRATE 50 MG/1
100 TABLET ORAL 2 TIMES DAILY
Qty: 120 TABLET | Refills: 11 | Status: SHIPPED | OUTPATIENT
Start: 2023-01-26 | End: 2024-01-26

## 2023-01-26 RX ORDER — DONEPEZIL HYDROCHLORIDE 5 MG/1
5 TABLET, FILM COATED ORAL DAILY
Qty: 30 TABLET | Refills: 11
Start: 2023-01-27 | End: 2024-01-27

## 2023-01-26 RX ADMIN — APIXABAN 5 MG: 5 TABLET, FILM COATED ORAL at 09:01

## 2023-01-26 RX ADMIN — METOPROLOL TARTRATE 50 MG: 50 TABLET, FILM COATED ORAL at 10:01

## 2023-01-26 RX ADMIN — APIXABAN 5 MG: 5 TABLET, FILM COATED ORAL at 10:01

## 2023-01-26 RX ADMIN — LEVOTHYROXINE SODIUM 50 MCG: 50 TABLET ORAL at 05:01

## 2023-01-26 RX ADMIN — METOPROLOL TARTRATE 50 MG: 50 TABLET, FILM COATED ORAL at 03:01

## 2023-01-26 RX ADMIN — Medication 6 MG: at 10:01

## 2023-01-26 RX ADMIN — MUPIROCIN: 20 OINTMENT TOPICAL at 09:01

## 2023-01-26 RX ADMIN — MAGNESIUM OXIDE TAB 400 MG (241.3 MG ELEMENTAL MG) 400 MG: 400 (241.3 MG) TAB at 09:01

## 2023-01-26 RX ADMIN — DONEPEZIL HYDROCHLORIDE 5 MG: 5 TABLET, FILM COATED ORAL at 09:01

## 2023-01-26 RX ADMIN — METOPROLOL TARTRATE 50 MG: 50 TABLET, FILM COATED ORAL at 09:01

## 2023-01-26 RX ADMIN — MUPIROCIN: 20 OINTMENT TOPICAL at 10:01

## 2023-01-26 RX ADMIN — ASPIRIN 81 MG CHEWABLE TABLET 81 MG: 81 TABLET CHEWABLE at 09:01

## 2023-01-26 NOTE — SUBJECTIVE & OBJECTIVE
Interval History:  No reported event, heart rate improved  Hypernatremia resolved    Appreciate cardiology recs-likely stress-induced RVR in the setting of dehydration and infection.  Continue Eliquis and beta-blocker  updated Margot over the phone regarding discharge with hospice given patient progressive dementia  Possible discharge today to nursing home with hospice      Review of Systems   Unable to perform ROS: Dementia   Objective:     Vital Signs (Most Recent):  Temp: 97.3 °F (36.3 °C) (01/26/23 0449)  Pulse: (!) 114 (01/26/23 0745)  Resp: 18 (01/26/23 0740)  BP: 132/85 (01/26/23 0740)  SpO2: (!) 92 % (01/26/23 0745)   Vital Signs (24h Range):  Temp:  [95.3 °F (35.2 °C)-97.5 °F (36.4 °C)] 97.3 °F (36.3 °C)  Pulse:  [] 114  Resp:  [16-24] 18  SpO2:  [91 %-100 %] 92 %  BP: (108-132)/(73-90) 132/85     Weight: 83 kg (182 lb 15.7 oz)  Body mass index is 25.52 kg/m².    Intake/Output Summary (Last 24 hours) at 1/26/2023 0913  Last data filed at 1/26/2023 0457  Gross per 24 hour   Intake --   Output 500 ml   Net -500 ml        Physical Exam  Vitals and nursing note reviewed.   Constitutional:       Appearance: He is not ill-appearing.   HENT:      Head: Normocephalic and atraumatic.      Comments: Hard of hearing  Neck:      Vascular: No carotid bruit.   Cardiovascular:      Rate and Rhythm: Normal rate. Rhythm irregular.      Heart sounds: No murmur heard.  Pulmonary:      Effort: No respiratory distress.   Abdominal:      Tenderness: There is no abdominal tenderness. There is no guarding.   Genitourinary:     Rectum: Guaiac result negative.   Musculoskeletal:         General: No swelling or deformity.      Cervical back: No tenderness.   Skin:     Findings: No erythema or rash.   Neurological:      Mental Status: He is disoriented.      Sensory: No sensory deficit.      Comments: Unable to communicate       Significant Labs: A1C: No results for input(s): HGBA1C in the last 4320 hours.  ABGs:   No results  for input(s): PH, PCO2, HCO3, POCSATURATED, BE, TOTALHB, COHB, METHB, O2HB, POCFIO2, PO2 in the last 48 hours.    BMP:   Recent Labs   Lab 01/26/23  0837   GLU 93   *   K 3.7   *   CO2 25   BUN 23   CREATININE 0.9   CALCIUM 8.8       CBC:   Recent Labs   Lab 01/25/23  0555   WBC 11.16   HGB 14.7   HCT 46.8          CMP:   Recent Labs   Lab 01/25/23  1912 01/26/23  0037 01/26/23  0837   * 145 147*   K 4.1 4.2 3.7   * 112* 112*   CO2 22* 23 25   GLU 67* 94 93   BUN 23 23 23   CREATININE 0.8 0.9 0.9   CALCIUM 8.3* 8.6* 8.8   ANIONGAP 11 10 10       Troponin:   No results for input(s): TROPONINI, TROPONINIHS in the last 48 hours.    TSH: No results for input(s): TSH in the last 4320 hours.  Urine Culture: No results for input(s): LABURIN in the last 48 hours.  Urine Studies:   No results for input(s): COLORU, APPEARANCEUA, PHUR, SPECGRAV, PROTEINUA, GLUCUA, KETONESU, BILIRUBINUA, OCCULTUA, NITRITE, UROBILINOGEN, LEUKOCYTESUR, RBCUA, WBCUA, BACTERIA, SQUAMEPITHEL, HYALINECASTS in the last 48 hours.    Invalid input(s): WRIGHTSUR      Significant Imaging: I have reviewed all pertinent imaging results/findings within the past 24 hours.

## 2023-01-26 NOTE — ASSESSMENT & PLAN NOTE
Possible due to poor oral intake.    Initial Na: 149, with subsequent Na; 154 -down trending and resolved    Plan:  ->s/p 2L of NS in ED  ->start D5W at 100cc an hour  ->BMP every 6 hours  ->consider nephrology consult

## 2023-01-26 NOTE — PROGRESS NOTES
[facility] Medicine  Progress Note    Patient Name: Yunier Aguillon  MRN: [id number]  Patient Class: IP- Inpatient   Admission Date: 1/22/2023  Length of Stay: 3 days  Attending Physician: Angella Connors*  Primary Care Provider: Tamir Daniels MD        Subjective:     Principal Problem:Atrial fibrillation with RVR        HPI:  Yunier Aguillon is 89yr old male with PMH of dementia, HLD, HTN, Afib, and BPH who presents with SOB.     Due to dementia and difficulty hearing, the pt wasn't able to give much of a story. Per EMS, pt presented with SOB that began on Sunday evening, with no associated fever, cough, or phlegm production. He was noted to be sating around 85% on RA, while in route. He was also noted to be in Afib with RVR, and he received 10mg of metoprolol. In addition, facility reports that pt has been a bit more combative then normal.     In ED, triage vitals were significant for tachycadia and tachypnea. CBC was significant for leukocytosis. CMP was significant for hypernatremia, elevated glucose, and elevated ALT. BNP was 1,266 and troponin was .095. CT head showed no acute intracranial hemorrhage, with lacunar type infarct in the left side of the qing. CXR showed no focal consolidation, although pt with course interstitial findings.     Pt was started on a dilitazem drip, with some improvement in the RVR     Medicine was consulted for admission for Afib with RVR treatment        Overview/Hospital Course:  No notes on file    Interval History:  No reported event, heart rate improved  Hypernatremia resolved    Appreciate cardiology recs-likely stress-induced RVR in the setting of dehydration and infection.  Continue Eliquis and beta-blocker  updated Margot over the phone regarding discharge with hospice given patient progressive dementia  Possible discharge today to nursing home with hospice      Review of Systems   Unable to perform ROS: Dementia   Objective:     Vital Signs  (Most Recent):  Temp: 97.3 °F (36.3 °C) (01/26/23 0449)  Pulse: (!) 114 (01/26/23 0745)  Resp: 18 (01/26/23 0740)  BP: 132/85 (01/26/23 0740)  SpO2: (!) 92 % (01/26/23 0745)   Vital Signs (24h Range):  Temp:  [95.3 °F (35.2 °C)-97.5 °F (36.4 °C)] 97.3 °F (36.3 °C)  Pulse:  [] 114  Resp:  [16-24] 18  SpO2:  [91 %-100 %] 92 %  BP: (108-132)/(73-90) 132/85     Weight: 83 kg (182 lb 15.7 oz)  Body mass index is 25.52 kg/m².    Intake/Output Summary (Last 24 hours) at 1/26/2023 0942  Last data filed at 1/26/2023 0458  Gross per 24 hour   Intake --   Output 500 ml   Net -500 ml        Physical Exam  Vitals and nursing note reviewed.   Constitutional:       Appearance: He is not ill-appearing.   HENT:      Head: Normocephalic and atraumatic.      Comments: Hard of hearing  Neck:      Vascular: No carotid bruit.   Cardiovascular:      Rate and Rhythm: Normal rate. Rhythm irregular.      Heart sounds: No murmur heard.  Pulmonary:      Effort: No respiratory distress.   Abdominal:      Tenderness: There is no abdominal tenderness. There is no guarding.   Genitourinary:     Rectum: Guaiac result negative.   Musculoskeletal:         General: No swelling or deformity.      Cervical back: No tenderness.   Skin:     Findings: No erythema or rash.   Neurological:      Mental Status: He is disoriented.      Sensory: No sensory deficit.      Comments: Unable to communicate       Significant Labs: A1C: No results for input(s): HGBA1C in the last 4320 hours.  ABGs:   No results for input(s): PH, PCO2, HCO3, POCSATURATED, BE, TOTALHB, COHB, METHB, O2HB, POCFIO2, PO2 in the last 48 hours.    BMP:   Recent Labs   Lab 01/26/23  0837   GLU 93   *   K 3.7   *   CO2 25   BUN 23   CREATININE 0.9   CALCIUM 8.8       CBC:   Recent Labs   Lab 01/25/23  0555   WBC 11.16   HGB 14.7   HCT 46.8          CMP:   Recent Labs   Lab 01/25/23  1912 01/26/23  0037 01/26/23  0837   * 145 147*   K 4.1 4.2 3.7   * 112* 112*    CO2 22* 23 25   GLU 67* 94 93   BUN 23 23 23   CREATININE 0.8 0.9 0.9   CALCIUM 8.3* 8.6* 8.8   ANIONGAP 11 10 10       Troponin:   No results for input(s): TROPONINI, TROPONINIHS in the last 48 hours.    TSH: No results for input(s): TSH in the last 4320 hours.  Urine Culture: No results for input(s): LABURIN in the last 48 hours.  Urine Studies:   No results for input(s): COLORU, APPEARANCEUA, PHUR, SPECGRAV, PROTEINUA, GLUCUA, KETONESU, BILIRUBINUA, OCCULTUA, NITRITE, UROBILINOGEN, LEUKOCYTESUR, RBCUA, WBCUA, BACTERIA, SQUAMEPITHEL, HYALINECASTS in the last 48 hours.    Invalid input(s): WRIGHTSUR      Significant Imaging: I have reviewed all pertinent imaging results/findings within the past 24 hours.      Assessment/Plan:      * Atrial fibrillation with RVR  Patient with Persistent (7 days or more) atrial fibrillation which is controlled currently with Diltazpam. Patient is currently in atrial fibrillation.[id number] Score: 3. HASBLED Score: 2+. Anticoagulation indicated. Anticoagulation done with Apixaban.    ---------------------------------------------------------  Unclear etiology for what caused pt to go into Afib with RVR. CXR without obvious pneumonia. UA is pending. No diarrhea or GI symptoms.       Plan:  Treat underlying etiology  C/w diliazem drip. Consider transitioning to orals in AM.  Discontinued due to low EF  C/w apixaban  Consult Cardiology- appreciate recs continue with Eliquis and up titrate beta-blocker        Advance care planning    Advance Care Planning     Date: 01/24/2023    Code Status  In light of the patients advanced and life limiting illness,I engaged the the family in a conversation about the patient's preferences for care  at the very end of life. The patient wishes to have a natural, peaceful death.  Along those lines, the patient does not wish to have CPR or other invasive treatments performed when his heart and/or breathing stops. I communicated to the family that a DNR  order would be placed in his medical record to reflect this preference.  I spent a total of 20 minutes engaging the patient in this advance care planning discussion.       [facility]  I engaged the family in a conversation about advance care planning and we specifically addressed what the goals of care would be moving forward, in light of the patient's change in clinical status, specifically progressive dementia.  We did specifically address the patient's likely prognosis, which is poor.  We explored the patient's values and preferences for future care.  The family endorses that what is most important right now is to focus on quality of life, even if it means sacrificing a little time and improvement in condition but with limits to invasive therapies    Accordingly, we have decided that the best plan to meet the patient's goals includes continuing with treatment    I did not explain the role for hospice care at this stage of the patient's illness, including its ability to help the patient live with the best quality of life possible.  We will not be making a hospice referral.    I spent a total of 25 minutes engaging the patient in this advance care planning discussion.     Advance Care Planning     Date: 01/26/2023    [facility]  I engaged the family in a conversation about advance care planning and we specifically addressed what the goals of care would be moving forward, in light of the patient's change in clinical status, specifically progressive dementia.  We did specifically address the patient's likely prognosis, which is poor.  We explored the patient's values and preferences for future care.  The family endorses that what is most important right now is to focus on spending time at home, symptom/pain control and quality of life, even if it means sacrificing a little time    Accordingly, we have decided that the best plan to meet the patient's goals includes continuing with treatment    I did explain the role for hospice  care at this stage of the patient's illness, including its ability to help the patient live with the best quality of life possible.  We will be making a hospice referral.    I spent a total of 20 minutes engaging the patient in this advance care planning discussion.                 Lacunar stroke  Noted on CT    Plan:  C/w ASA and Apixaban  Hold statin due to slightly elevated LFTs      Hypernatremia  Possible due to poor oral intake.    Initial Na: 149, with subsequent Na; 154 -down trending and resolved    Plan:  ->s/p 2L of NS in ED  ->start D5W at 100cc an hour  ->BMP every 6 hours  ->consider nephrology consult      Vascular dementia without behavioral disturbance  C/w home donepezil 5mg daily  Discharge with hospice    Hypothyroidism, unspecified  C/w synthroid 50mcg dose      Hyperlipidemia  Hold statin due to slighted elevated LFTs      Hypertension  C/w home amlodipine 10mg-discontinue  C/w home losartan 50mg        VTE Risk Mitigation (From admission, onward)         Ordered     apixaban tablet 5 mg  2 times daily         01/23/23 0028     IP VTE HIGH RISK PATIENT  Once         01/23/23 0024     Place sequential compression device  Until discontinued         01/23/23 0024                Discharge Planning   SHENA: 1/26/2023     Code Status: DNR   Is the patient medically ready for discharge?:     Reason for patient still in hospital (select all that apply): Pending disposition  Discharge Plan A: Return to nursing home                  Angella Connors MD  Department of Hospital Medicine   [facility]

## 2023-01-26 NOTE — ASSESSMENT & PLAN NOTE
Patient with Persistent (7 days or more) atrial fibrillation which is controlled currently with Diltazpam. Patient is currently in atrial fibrillation.[id number] Score: 3. HASBLED Score: 2+. Anticoagulation indicated. Anticoagulation done with Apixaban.    ---------------------------------------------------------  Unclear etiology for what caused pt to go into Afib with RVR. CXR without obvious pneumonia. UA is pending. No diarrhea or GI symptoms.       Plan:  Treat underlying etiology  C/w diliazem drip. Consider transitioning to orals in AM.  Discontinued due to low EF  C/w apixaban  Consult Cardiology- appreciate recs continue with Eliquis and up titrate beta-blocker

## 2023-01-26 NOTE — PLAN OF CARE
Discharge orders noted. AVS prepared with medication details complete. Clinical reference list and 24/7 [facility] Nurse On Call number attached. Bedside nurse to print AVS and place in discharge packet for accepting facility once cleared by .

## 2023-01-26 NOTE — ASSESSMENT & PLAN NOTE
Advance Care Planning     Date: 01/24/2023    Code Status  In light of the patients advanced and life limiting illness,I engaged the the family in a conversation about the patient's preferences for care  at the very end of life. The patient wishes to have a natural, peaceful death.  Along those lines, the patient does not wish to have CPR or other invasive treatments performed when his heart and/or breathing stops. I communicated to the family that a DNR order would be placed in his medical record to reflect this preference.  I spent a total of 20 minutes engaging the patient in this advance care planning discussion.       [facility]  I engaged the family in a conversation about advance care planning and we specifically addressed what the goals of care would be moving forward, in light of the patient's change in clinical status, specifically progressive dementia.  We did specifically address the patient's likely prognosis, which is poor.  We explored the patient's values and preferences for future care.  The family endorses that what is most important right now is to focus on quality of life, even if it means sacrificing a little time and improvement in condition but with limits to invasive therapies    Accordingly, we have decided that the best plan to meet the patient's goals includes continuing with treatment    I did not explain the role for hospice care at this stage of the patient's illness, including its ability to help the patient live with the best quality of life possible.  We will not be making a hospice referral.    I spent a total of 25 minutes engaging the patient in this advance care planning discussion.     Advance Care Planning     Date: 01/26/2023    [facility]  I engaged the family in a conversation about advance care planning and we specifically addressed what the goals of care would be moving forward, in light of the patient's change in clinical status, specifically progressive dementia.  We did  specifically address the patient's likely prognosis, which is poor.  We explored the patient's values and preferences for future care.  The family endorses that what is most important right now is to focus on spending time at home, symptom/pain control and quality of life, even if it means sacrificing a little time    Accordingly, we have decided that the best plan to meet the patient's goals includes continuing with treatment    I did explain the role for hospice care at this stage of the patient's illness, including its ability to help the patient live with the best quality of life possible.  We will be making a hospice referral.    I spent a total of 20 minutes engaging the patient in this advance care planning discussion.

## 2023-01-26 NOTE — PLAN OF CARE
[facility]    FACILITY TRANSFER ORDERS      Patient Name: Yunier Aguillon  YOB: 1930    PCP: Tamir Daniels MD   PCP Address: [address] / [place] [address]  PCP Phone Number: [phone]  PCP Fax: [phone]    Encounter Date: 01/26/2023    Admit to: [facility] with hospice     Vital Signs:  Routine    Diagnoses:   Active Hospital Problems    Diagnosis  POA    *Atrial fibrillation with RVR [I48.91]  Yes     Chronic     Last Assessment & Plan:   Formatting of this note might be different from the original.  History & Physical Patient with chronic atrial fibrillation.  Is on Eliquis to continue the same.  Is rate controlled.  Continue to monitor    Discharge Summary Patient has chronic atrial fibrillation.  Discussed risks and benefits of anticoagulation due to patient having recent falls.  Patient and family decided to continue with Eliquis therapy at this time.  We will also continue home metoprolol and sotalol therapy.  Follow-up with PCP.    Follow-up Continue on Eliquis.  Patient and family appear to want to continue on anticoagulation for now despite recent falls.  Will need to further address outpatient.      Advance care planning [Z71.89]  Not Applicable    Hypernatremia [E87.0]  Yes    Lacunar stroke [I63.81]  Yes    Hypertension [I10]  Yes    Vascular dementia without behavioral disturbance [F01.50]  Yes    Hyperlipidemia [E78.5]  Yes     ICD-10 Transition  Formatting of this note might be different from the original.  ICD-10 Transition      Hypothyroidism, unspecified [E03.9]  Yes     Formatting of this note might be different from the original.  Replacing Dx inactivated by 10/1 regulatory import      Coronary artery disease involving coronary bypass graft of native heart without angina pectoris [I25.810]  Yes     Last Assessment & Plan:   Formatting of this note might be different from the original.  History & Physical No chest pain or shortness of breath.  Continue  current medications    Discharge Summary Patient did not have any chest pain or shortness of breath during admission.  Continue on home medications.    Follow-up  No chest pain or shortness of breath.  Continue current medications.      History of transcatheter aortic valve replacement (TAVR) [Z95.2]  Not Applicable     Formatting of this note might be different from the original.  7/31/2019 by Dr. Anayeli Liao Cardiology        Resolved Hospital Problems   No resolved problems to display.       Allergies:  Review of patient's allergies indicates:   Allergen Reactions    Ace inhibitors        Diet: cardiac diet    Activities: Activity as tolerated    Goals of Care Treatment Preferences:  Code Status: DNR          What is most important right now is to focus on quality of life, even if it means sacrificing a little time, improvement in condition but with limits to invasive therapies, spending time at home, symptom/pain control, quality of life, even if it means sacrificing a little time.  Accordingly, we have decided that the best plan to meet the patient's goals includes continuing with treatment, continuing with treatment.      Nursing:  Per facility protocol    Supplemental oxygen: 2L NC           Medications: Review discharge medications with patient and family and provide education.      Current Discharge Medication List        START taking these medications    Details   apixaban (ELIQUIS) 5 mg Tab Take 1 tablet (5 mg total) by mouth 2 (two) times daily.  Qty: 60 tablet, Refills: 11           CONTINUE these medications which have CHANGED    Details   donepeziL (ARICEPT) 5 MG tablet Take 1 tablet (5 mg total) by mouth once daily.  Qty: 30 tablet, Refills: 11      metoprolol tartrate (LOPRESSOR) 50 MG tablet Take 2 tablets (100 mg total) by mouth 2 (two) times daily.  Qty: 120 tablet, Refills: 11    Comments: .           CONTINUE these medications which have NOT CHANGED    Details   aspirin 81 MG Chew Take 81 mg  by mouth once daily.      atorvastatin (LIPITOR) 40 MG tablet Take 40 mg by mouth once daily.      bisacodyL (DULCOLAX) 10 mg Supp Place 10 mg rectally daily as needed.      CALMOSEPTINE 0.44-20.6 % Oint Apply topically daily as needed. Apply to buttocks daily with each shower and as needed for diaper changes      cholecalciferol, vitamin D3, (VITAMIN D3) 50 mcg (2,000 unit) Tab Take 4,000 Units by mouth once daily. Take 2 tablets by mouth daily      EUTHYROX 50 mcg tablet Take 1 tablet (50 mcg total) by mouth every morning.  Qty: 90 tablet, Refills: 3      HEMORRHOIDAL,PE-MIN OIL-CHANDNI, 0.25-14-74.9 % Oint Place 1 applicator rectally 4 (four) times daily as needed.      LORazepam (ATIVAN) 0.5 MG tablet Take 0.5 mg by mouth every 6 (six) hours as needed.      losartan (COZAAR) 25 MG tablet Take 25 mg by mouth once daily.      magnesium oxide (MAG-OX) 400 mg (241.3 mg magnesium) tablet Take 400 mg by mouth every evening.      polyethylene glycol (GLYCOLAX) 17 gram/dose powder Take 17 g by mouth once daily.      potassium chloride SA (K-DUR,KLOR-CON M) 10 MEQ tablet Take 10 mEq by mouth every morning. Take with Furosemide 20 mg      QUEtiapine (SEROQUEL) 25 MG Tab Take 25 mg by mouth once daily.      sertraline (ZOLOFT) 50 MG tablet Take 50 mg by mouth once daily.      tamsulosin (FLOMAX) 0.4 mg Cap Take 0.4 mg by mouth once daily.      valproic acid (DEPAKENE) 250 mg capsule Take 250 mg by mouth 2 (two) times daily.      zinc oxide 20 % ointment Apply topically as needed. Apply to affected areas as needed for redness and skin irritation           STOP taking these medications       furosemide (LASIX) 20 MG tablet Comments:   Reason for Stopping:                  Immunizations Administered as of 1/26/2023       Name Date Dose VIS Date Route Exp Date    COVID-19, MRNA, LN-S, PF (Pfizer) (Purple Cap) 11/19/2021 -- -- -- --    Lot: [id number]     External: Auto Reconciled From Outside Source     COVID-19, MRNA, LN-S, PF  (Pfizer) (Purple Cap) 2/4/2021  1:10 PM 0.3 mL 12/12/2020 Intramuscular 6/30/2021    Site: Left deltoid     Given By: Mónica Ward LPN     : [employer]     Lot: [id number]     COVID-19, MRNA, LN-S, PF (Pfizer) (Purple Cap) 1/14/2021  1:33 PM 0.3 mL 12/12/2020 Intramuscular 5/31/2021    Site: Left deltoid     Given By: Beata Marshall LPN     : [employer]     Lot: [id number]             This patient has had both covid vaccinations    Some patients may experience side effects after vaccination.  These may include fever, headache, muscle or joint aches.  Most symptoms resolve with 24-48 hours and do not require urgent medical evaluation unless they persist for more than 72 hours or symptoms are concerning for an unrelated medical condition.          _________________________________  Angella Connors MD  01/26/2023

## 2023-01-26 NOTE — PLAN OF CARE
Problem: Adult Inpatient Plan of Care  Goal: Plan of Care Review  1/26/2023 0019 by Iqra Magaña RN  Outcome: Ongoing, Progressing  1/26/2023 0016 by Iqra Magaña RN  Outcome: Ongoing, Progressing     Problem: Skin Injury Risk Increased  Goal: Skin Health and Integrity  1/26/2023 0019 by Iqra Magaña RN  Outcome: Ongoing, Progressing  1/26/2023 0016 by Iqra Magaña RN  Outcome: Ongoing, Progressing     Problem: Fall Injury Risk  Goal: Absence of Fall and Fall-Related Injury  Outcome: Ongoing, Progressing     Problem: Infection  Goal: Absence of Infection Signs and Symptoms  1/26/2023 0019 by Iqra Magaña RN  Outcome: Ongoing, Progressing  1/26/2023 0016 by Iqra Magaña RN  Outcome: Ongoing, Progressing

## 2023-01-26 NOTE — PLAN OF CARE
"1013  Patient's face sheet, H&P, & NH orders manually faxed to the [facility] (f [phone]). Awaiting response.     1022  Referral sent to [facility] via [facility]. Awaiting response.     1055  CM informed Milly ([phone]) of referral sent via [facility].     1100  CM informed the pt's daughter, Johana Hurtado ([phone]), of the plan for the pt to discharge back to the [facility] w/[facility]. Johana verbalized understanding, agreement, & appreciation. Pt Choice Form completed & to be placed in the pt's charge. Daughter requested to be notified when the pt leaves [facility].     1108  CM was informed by Lindsey ([phone], [phone]) with the [facility] that the patient will be accepted back to room 102B today & that they will provide WC van transportation between 0450-8622.    Message sent to nurse Portillo & virtual nurse Yakov informing that the pt is cleared to discharge back to the [facility] when transportation arrives, requested that nurse Portillo call report, & notify the pt's daughter when the patient leaves [facility]. CM to bring the transportation packet to the nurse's station.     1150  CM was informed by nurse Portillo that the pt's davis cath was removed at 1040 & voiding trial in progress. CM informed Lindsey of above & that WC van transportation may need to be delayed. Completed "Pt Choice Form" placed in the pt's chart & transportation packet left at the nurse's station.    1430  CM informed Lindsey that the patient has not voided yet.     1610  CM questioned nurse Bandar if the pt had voided. Nurse stated she would find out & call this CM back. Awaiting response.    1630  Message sent to Dr Washington & nurse Portillo questioning voiding status. Bandar stated that the pt did not void & that a bladder scan was completed with 60ml urine noted. GYPSY questioned if the davis catheter will be replaced.     CM was informed by Lindsey that the pt will not be " able to return to the NH today. Message sent to Dr Washington & nurse Bandar informing of above.     CM informed that pt's daughter, Johana Cano, of the delay in discharge.       Will continue to follow.

## 2023-01-26 NOTE — PLAN OF CARE
Problem: Adult Inpatient Plan of Care  Goal: Plan of Care Review  Outcome: Ongoing, Progressing     Problem: Skin Injury Risk Increased  Goal: Skin Health and Integrity  Outcome: Ongoing, Progressing     Problem: Fall Injury Risk  Goal: Absence of Fall and Fall-Related Injury  Outcome: Ongoing, Progressing     Problem: Infection  Goal: Absence of Infection Signs and Symptoms  Outcome: Ongoing, Progressing

## 2023-01-26 NOTE — PLAN OF CARE
RN Case  Order Review      Date:  01/26/2023    Discharging Facility noted:   Return to [facility] with Hospice    Med List Reconciled?:     Yes    Lines noted:  PIV x 2. Pt be removed prior to DC    LBM: )1/24/2023  Woundcare/Wound vac:  NA    ABX with end date: NA  Discharging with Line:  O2 at 2L per NC at DC    Ins auth:   NA    Intake forms completed:  NA

## 2023-01-27 VITALS
TEMPERATURE: 98 F | HEIGHT: 71 IN | RESPIRATION RATE: 18 BRPM | DIASTOLIC BLOOD PRESSURE: 93 MMHG | HEART RATE: 117 BPM | BODY MASS INDEX: 25.68 KG/M2 | WEIGHT: 183.44 LBS | SYSTOLIC BLOOD PRESSURE: 135 MMHG | OXYGEN SATURATION: 95 %

## 2023-01-27 LAB
ANION GAP SERPL CALC-SCNC: 10 MMOL/L (ref 8–16)
ANION GAP SERPL CALC-SCNC: 7 MMOL/L (ref 8–16)
ANION GAP SERPL CALC-SCNC: 7 MMOL/L (ref 8–16)
BUN SERPL-MCNC: 23 MG/DL (ref 10–30)
BUN SERPL-MCNC: 24 MG/DL (ref 10–30)
BUN SERPL-MCNC: 27 MG/DL (ref 10–30)
CALCIUM SERPL-MCNC: 8.2 MG/DL (ref 8.7–10.5)
CALCIUM SERPL-MCNC: 8.4 MG/DL (ref 8.7–10.5)
CALCIUM SERPL-MCNC: 8.4 MG/DL (ref 8.7–10.5)
CHLORIDE SERPL-SCNC: 109 MMOL/L (ref 95–110)
CHLORIDE SERPL-SCNC: 111 MMOL/L (ref 95–110)
CHLORIDE SERPL-SCNC: 112 MMOL/L (ref 95–110)
CO2 SERPL-SCNC: 23 MMOL/L (ref 23–29)
CO2 SERPL-SCNC: 24 MMOL/L (ref 23–29)
CO2 SERPL-SCNC: 26 MMOL/L (ref 23–29)
CREAT SERPL-MCNC: 0.8 MG/DL (ref 0.5–1.4)
CREAT SERPL-MCNC: 0.9 MG/DL (ref 0.5–1.4)
CREAT SERPL-MCNC: 0.9 MG/DL (ref 0.5–1.4)
EST. GFR  (NO RACE VARIABLE): >60 ML/MIN/1.73 M^2
GLUCOSE SERPL-MCNC: 100 MG/DL (ref 70–110)
GLUCOSE SERPL-MCNC: 103 MG/DL (ref 70–110)
GLUCOSE SERPL-MCNC: 114 MG/DL (ref 70–110)
POCT GLUCOSE: 113 MG/DL (ref 70–110)
POTASSIUM SERPL-SCNC: 3.8 MMOL/L (ref 3.5–5.1)
POTASSIUM SERPL-SCNC: 4 MMOL/L (ref 3.5–5.1)
POTASSIUM SERPL-SCNC: 4.1 MMOL/L (ref 3.5–5.1)
SODIUM SERPL-SCNC: 142 MMOL/L (ref 136–145)
SODIUM SERPL-SCNC: 143 MMOL/L (ref 136–145)
SODIUM SERPL-SCNC: 144 MMOL/L (ref 136–145)

## 2023-01-27 PROCEDURE — 36415 COLL VENOUS BLD VENIPUNCTURE: CPT | Performed by: STUDENT IN AN ORGANIZED HEALTH CARE EDUCATION/TRAINING PROGRAM

## 2023-01-27 PROCEDURE — 25000003 PHARM REV CODE 250: Performed by: STUDENT IN AN ORGANIZED HEALTH CARE EDUCATION/TRAINING PROGRAM

## 2023-01-27 PROCEDURE — 99900035 HC TECH TIME PER 15 MIN (STAT)

## 2023-01-27 PROCEDURE — 25000003 PHARM REV CODE 250: Performed by: FAMILY MEDICINE

## 2023-01-27 PROCEDURE — 80048 BASIC METABOLIC PNL TOTAL CA: CPT | Mod: 91 | Performed by: STUDENT IN AN ORGANIZED HEALTH CARE EDUCATION/TRAINING PROGRAM

## 2023-01-27 PROCEDURE — 25000003 PHARM REV CODE 250: Performed by: INTERNAL MEDICINE

## 2023-01-27 RX ADMIN — METOPROLOL TARTRATE 50 MG: 50 TABLET, FILM COATED ORAL at 09:01

## 2023-01-27 RX ADMIN — ASPIRIN 81 MG CHEWABLE TABLET 81 MG: 81 TABLET CHEWABLE at 09:01

## 2023-01-27 RX ADMIN — DONEPEZIL HYDROCHLORIDE 5 MG: 5 TABLET, FILM COATED ORAL at 09:01

## 2023-01-27 RX ADMIN — MAGNESIUM OXIDE TAB 400 MG (241.3 MG ELEMENTAL MG) 400 MG: 400 (241.3 MG) TAB at 09:01

## 2023-01-27 RX ADMIN — MUPIROCIN: 20 OINTMENT TOPICAL at 09:01

## 2023-01-27 RX ADMIN — LEVOTHYROXINE SODIUM 50 MCG: 50 TABLET ORAL at 07:01

## 2023-01-27 RX ADMIN — APIXABAN 5 MG: 5 TABLET, FILM COATED ORAL at 09:01

## 2023-01-27 NOTE — PLAN OF CARE
[facility]    FACILITY TRANSFER ORDERS      Patient Name: Yunier Aguillon  YOB: 1930    PCP: Tamir Daniels MD   PCP Address: [address] [facility] / [place] [address]  PCP Phone Number: [phone]  PCP Fax: [phone]    Encounter Date: 01/27/2023    Admit to: [facility] with hospice    Vital Signs:  Routine    Diagnoses:   Active Hospital Problems    Diagnosis  POA    *Atrial fibrillation with RVR [I48.91]  Yes     Chronic     Last Assessment & Plan:   Formatting of this note might be different from the original.  History & Physical Patient with chronic atrial fibrillation.  Is on Eliquis to continue the same.  Is rate controlled.  Continue to monitor    Discharge Summary Patient has chronic atrial fibrillation.  Discussed risks and benefits of anticoagulation due to patient having recent falls.  Patient and family decided to continue with Eliquis therapy at this time.  We will also continue home metoprolol and sotalol therapy.  Follow-up with PCP.    Follow-up Continue on Eliquis.  Patient and family appear to want to continue on anticoagulation for now despite recent falls.  Will need to further address outpatient.      Advance care planning [Z71.89]  Not Applicable    Hypernatremia [E87.0]  Yes    Lacunar stroke [I63.81]  Yes    Hypertension [I10]  Yes    Vascular dementia without behavioral disturbance [F01.50]  Yes    Hyperlipidemia [E78.5]  Yes     ICD-10 Transition  Formatting of this note might be different from the original.  ICD-10 Transition      Hypothyroidism, unspecified [E03.9]  Yes     Formatting of this note might be different from the original.  Replacing Dx inactivated by 10/1 regulatory import      Coronary artery disease involving coronary bypass graft of native heart without angina pectoris [I25.810]  Yes     Last Assessment & Plan:   Formatting of this note might be different from the original.  History & Physical No chest pain or shortness of breath.  Continue  current medications    Discharge Summary Patient did not have any chest pain or shortness of breath during admission.  Continue on home medications.    Follow-up  No chest pain or shortness of breath.  Continue current medications.      History of transcatheter aortic valve replacement (TAVR) [Z95.2]  Not Applicable     Formatting of this note might be different from the original.  7/31/2019 by Dr. Anayeli Liao Cardiology        Resolved Hospital Problems   No resolved problems to display.       Allergies:  Review of patient's allergies indicates:   Allergen Reactions    Ace inhibitors        Diet: cardiac diet    Activities: Activity as tolerated    Goals of Care Treatment Preferences:  Code Status: DNR          What is most important right now is to focus on quality of life, even if it means sacrificing a little time, improvement in condition but with limits to invasive therapies, spending time at home, symptom/pain control, quality of life, even if it means sacrificing a little time.  Accordingly, we have decided that the best plan to meet the patient's goals includes continuing with treatment, continuing with treatment.      Nursing: per facility protocol         CONSULTS:    Physical Therapy to evaluate and treat.  and Occupational Therapy to evaluate and treat.    MISCELLANEOUS CARE:  Cabrales Care: Empty Cabrales bag every shift. Change Cabrales every month.      Medications: Review discharge medications with patient and family and provide education.      Current Discharge Medication List        START taking these medications    Details   apixaban (ELIQUIS) 5 mg Tab Take 1 tablet (5 mg total) by mouth 2 (two) times daily.  Qty: 60 tablet, Refills: 11           CONTINUE these medications which have CHANGED    Details   donepeziL (ARICEPT) 5 MG tablet Take 1 tablet (5 mg total) by mouth once daily.  Qty: 30 tablet, Refills: 11      metoprolol tartrate (LOPRESSOR) 50 MG tablet Take 2 tablets (100 mg total) by mouth 2  (two) times daily.  Qty: 120 tablet, Refills: 11    Comments: .           CONTINUE these medications which have NOT CHANGED    Details   aspirin 81 MG Chew Take 81 mg by mouth once daily.      atorvastatin (LIPITOR) 40 MG tablet Take 40 mg by mouth once daily.      bisacodyL (DULCOLAX) 10 mg Supp Place 10 mg rectally daily as needed.      CALMOSEPTINE 0.44-20.6 % Oint Apply topically daily as needed. Apply to buttocks daily with each shower and as needed for diaper changes      cholecalciferol, vitamin D3, (VITAMIN D3) 50 mcg (2,000 unit) Tab Take 4,000 Units by mouth once daily. Take 2 tablets by mouth daily      EUTHYROX 50 mcg tablet Take 1 tablet (50 mcg total) by mouth every morning.  Qty: 90 tablet, Refills: 3      HEMORRHOIDAL,PE-MIN OIL-CHANDNI, 0.25-14-74.9 % Oint Place 1 applicator rectally 4 (four) times daily as needed.      LORazepam (ATIVAN) 0.5 MG tablet Take 0.5 mg by mouth every 6 (six) hours as needed.      losartan (COZAAR) 25 MG tablet Take 25 mg by mouth once daily.      magnesium oxide (MAG-OX) 400 mg (241.3 mg magnesium) tablet Take 400 mg by mouth every evening.      polyethylene glycol (GLYCOLAX) 17 gram/dose powder Take 17 g by mouth once daily.      potassium chloride SA (K-DUR,KLOR-CON M) 10 MEQ tablet Take 10 mEq by mouth every morning. Take with Furosemide 20 mg      QUEtiapine (SEROQUEL) 25 MG Tab Take 25 mg by mouth once daily.      sertraline (ZOLOFT) 50 MG tablet Take 50 mg by mouth once daily.      tamsulosin (FLOMAX) 0.4 mg Cap Take 0.4 mg by mouth once daily.      valproic acid (DEPAKENE) 250 mg capsule Take 250 mg by mouth 2 (two) times daily.      zinc oxide 20 % ointment Apply topically as needed. Apply to affected areas as needed for redness and skin irritation           STOP taking these medications       furosemide (LASIX) 20 MG tablet Comments:   Reason for Stopping:                  Immunizations Administered as of 1/27/2023       Name Date Dose VIS Date Route Exp Date     COVID-19, MRNA, LN-S, PF (Pfizer) (Purple Cap) 11/19/2021 -- -- -- --    Lot: [id number]     External: Auto Reconciled From Outside Source     COVID-19, MRNA, LN-S, PF (Pfizer) (Purple Cap) 2/4/2021  1:10 PM 0.3 mL 12/12/2020 Intramuscular 6/30/2021    Site: Left deltoid     Given By: Mónica Ward LPN     : [employer]     Lot: [id number]     COVID-19, MRNA, LN-S, PF (Pfizer) (Purple Cap) 1/14/2021  1:33 PM 0.3 mL 12/12/2020 Intramuscular 5/31/2021    Site: Left deltoid     Given By: Beata Marshall LPN     : [employer]     Lot: [id number]             This patient has had both covid vaccinations    Some patients may experience side effects after vaccination.  These may include fever, headache, muscle or joint aches.  Most symptoms resolve with 24-48 hours and do not require urgent medical evaluation unless they persist for more than 72 hours or symptoms are concerning for an unrelated medical condition.          _________________________________  Angella Connors MD  01/27/2023

## 2023-01-27 NOTE — NURSING
Transportation here to escort pt. Telemetry monitor discontinued as well as IV.  Discharge packet provided to transport

## 2023-01-27 NOTE — ASSESSMENT & PLAN NOTE
Patient with Persistent (7 days or more) atrial fibrillation which is controlled currently with Diltazpam. Patient is currently in atrial fibrillation.[id number] Score: 3. HASBLED Score: 2+. Anticoagulation indicated. Anticoagulation done with Apixaban.    ---------------------------------------------------------  Unclear etiology for what caused pt to go into Afib with RVR. CXR without obvious pneumonia. UA is pending. No diarrhea or GI symptoms.       Plan:  Treat underlying etiology  C/w diliazem drip. Consider transitioning to orals in AM.  Discontinued due to low EF  C/w apixaban  Consult Cardiology- appreciate recs continue with Eliquis and up titrate beta-blocker

## 2023-01-27 NOTE — SUBJECTIVE & OBJECTIVE
Interval History:  Awake and alert, sitting up in bed. No reported event, heart rate improved    Appreciate cardiology recs-likely stress-induced RVR in the setting of dehydration and infection.  Continue Eliquis and beta-blocker  Possible discharge today to nursing home with hospice      Review of Systems   Unable to perform ROS: Dementia   Objective:     Vital Signs (Most Recent):  Temp: 97.8 °F (36.6 °C) (01/27/23 1137)  Pulse: (!) 117 (01/27/23 1137)  Resp: 18 (01/27/23 1137)  BP: (!) 135/93 (01/27/23 1137)  SpO2: 95 % (01/27/23 1137)   Vital Signs (24h Range):  Temp:  [97.1 °F (36.2 °C)-98 °F (36.7 °C)] 97.8 °F (36.6 °C)  Pulse:  [] 117  Resp:  [16-19] 18  SpO2:  [93 %-96 %] 95 %  BP: (114-135)/(71-96) 135/93     Weight: 83.2 kg (183 lb 6.8 oz)  Body mass index is 25.58 kg/m².    Intake/Output Summary (Last 24 hours) at 1/27/2023 1147  Last data filed at 1/27/2023 0516  Gross per 24 hour   Intake --   Output 300 ml   Net -300 ml        Physical Exam  Vitals and nursing note reviewed.   Constitutional:       Appearance: He is not ill-appearing.   HENT:      Head: Normocephalic and atraumatic.      Comments: Hard of hearing  Neck:      Vascular: No carotid bruit.   Cardiovascular:      Rate and Rhythm: Normal rate. Rhythm irregular.      Heart sounds: No murmur heard.  Pulmonary:      Effort: No respiratory distress.   Abdominal:      Tenderness: There is no abdominal tenderness. There is no guarding.   Genitourinary:     Rectum: Guaiac result negative.   Musculoskeletal:         General: No swelling or deformity.      Cervical back: No tenderness.   Skin:     Findings: No erythema or rash.   Neurological:      Mental Status: He is disoriented.      Sensory: No sensory deficit.      Comments: Unable to communicate       Significant Labs: A1C: No results for input(s): HGBA1C in the last 4320 hours.  ABGs:   No results for input(s): PH, PCO2, HCO3, POCSATURATED, BE, TOTALHB, COHB, METHB, O2HB, POCFIO2, PO2 in  the last 48 hours.    BMP:   Recent Labs   Lab 01/27/23  0402         K 4.0   *   CO2 23   BUN 23   CREATININE 0.8   CALCIUM 8.2*       CBC:   No results for input(s): WBC, HGB, HCT, PLT in the last 48 hours.    CMP:   Recent Labs   Lab 01/26/23  1806 01/26/23  2353 01/27/23  0402   * 143 142   K 4.2 4.1 4.0   * 109 112*   CO2 25 24 23   GLU 91 114* 103   BUN 24 24 23   CREATININE 0.8 0.9 0.8   CALCIUM 8.6* 8.4* 8.2*   ANIONGAP 10 10 7*       Troponin:   No results for input(s): TROPONINI, TROPONINIHS in the last 48 hours.    TSH: No results for input(s): TSH in the last 4320 hours.  Urine Culture: No results for input(s): LABURIN in the last 48 hours.  Urine Studies:   No results for input(s): COLORU, APPEARANCEUA, PHUR, SPECGRAV, PROTEINUA, GLUCUA, KETONESU, BILIRUBINUA, OCCULTUA, NITRITE, UROBILINOGEN, LEUKOCYTESUR, RBCUA, WBCUA, BACTERIA, SQUAMEPITHEL, HYALINECASTS in the last 48 hours.    Invalid input(s): JOSE      Significant Imaging: I have reviewed all pertinent imaging results/findings within the past 24 hours.

## 2023-01-27 NOTE — ASSESSMENT & PLAN NOTE
Advance Care Planning     Date: 01/24/2023    Code Status  In light of the patients advanced and life limiting illness,I engaged the the family in a conversation about the patient's preferences for care  at the very end of life. The patient wishes to have a natural, peaceful death.  Along those lines, the patient does not wish to have CPR or other invasive treatments performed when his heart and/or breathing stops. I communicated to the family that a DNR order would be placed in his medical record to reflect this preference.  I spent a total of 20 minutes engaging the patient in this advance care planning discussion.       [facility]  I engaged the family in a conversation about advance care planning and we specifically addressed what the goals of care would be moving forward, in light of the patient's change in clinical status, specifically progressive dementia.  We did specifically address the patient's likely prognosis, which is poor.  We explored the patient's values and preferences for future care.  The family endorses that what is most important right now is to focus on quality of life, even if it means sacrificing a little time and improvement in condition but with limits to invasive therapies    Accordingly, we have decided that the best plan to meet the patient's goals includes continuing with treatment    I did not explain the role for hospice care at this stage of the patient's illness, including its ability to help the patient live with the best quality of life possible.  We will not be making a hospice referral.    I spent a total of 25 minutes engaging the patient in this advance care planning discussion.     Advance Care Planning     Date: 01/26/2023    [facility]  I engaged the family in a conversation about advance care planning and we specifically addressed what the goals of care would be moving forward, in light of the patient's change in clinical status, specifically progressive dementia.  We did  specifically address the patient's likely prognosis, which is poor.  We explored the patient's values and preferences for future care.  The family endorses that what is most important right now is to focus on spending time at home, symptom/pain control and quality of life, even if it means sacrificing a little time    Accordingly, we have decided that the best plan to meet the patient's goals includes continuing with treatment    I did explain the role for hospice care at this stage of the patient's illness, including its ability to help the patient live with the best quality of life possible.  We will be making a hospice referral.    I spent a total of 20 minutes engaging the patient in this advance care planning discussion.

## 2023-01-27 NOTE — PLAN OF CARE
0900  CM was informed by nurse Portillo that the night nurse reports the patient voided overnight. Will plan to discharge patient back to the [facility] today.     0915  CM received a call from Nathan ([phone], ext [phone]) w/the [facility] stating that the pt will be accepted for admission today, requested that report be called to Cresencio ([phone], ext [phone]), & that  van transportation is scheduled for a pickup at 1100.    0925  Message sent to nurse Weber & virtual nurse Yakov informing of above. Transportation packet previously left at the nurse's station.     0935  CM received a call from the pt's daughter, Johana Hurtado, questioning the pt's discharge status. Update given. Johana verbalized understanding, agreement, & appreciation.       Will continue to follow.

## 2023-01-27 NOTE — PLAN OF CARE
Pt on RA, no respiratory distress noted. will continue to monitor.     CHIEF COMPLAINT:    Interval Events:    REVIEW OF SYSTEMS:  Constitutional: [ ] negative [ ] fevers [ ] chills [ ] weight loss [ ] weight gain  HEENT: [ ] negative [ ] dry eyes [ ] eye irritation [ ] postnasal drip [ ] nasal congestion  CV: [ ] negative  [ ] chest pain [ ] orthopnea [ ] palpitations [ ] murmur  Resp: [ ] negative [ ] cough [ ] shortness of breath [ ] dyspnea [ ] wheezing [ ] sputum [ ] hemoptysis  GI: [ ] negative [ ] nausea [ ] vomiting [ ] diarrhea [ ] constipation [ ] abd pain [ ] dysphagia   : [ ] negative [ ] dysuria [ ] nocturia [ ] hematuria [ ] increased urinary frequency  Musculoskeletal: [ ] negative [ ] back pain [ ] myalgias [ ] arthralgias [ ] fracture  Skin: [ ] negative [ ] rash [ ] itch  Neurological: [ ] negative [ ] headache [ ] dizziness [ ] syncope [ ] weakness [ ] numbness  Psychiatric: [ ] negative [ ] anxiety [ ] depression  Endocrine: [ ] negative [ ] diabetes [ ] thyroid problem  Hematologic/Lymphatic: [ ] negative [ ] anemia [ ] bleeding problem  Allergic/Immunologic: [ ] negative [ ] itchy eyes [ ] nasal discharge [ ] hives [ ] angioedema  [ ] All other systems negative  [ ] Unable to assess ROS because ________    OBJECTIVE:  ICU Vital Signs Last 24 Hrs  T(C): 36.8 (21 Jan 2019 04:00), Max: 36.8 (20 Jan 2019 16:00)  T(F): 98.2 (21 Jan 2019 04:00), Max: 98.2 (20 Jan 2019 16:00)  HR: 76 (21 Jan 2019 06:00) (61 - 78)  BP: 154/80 (21 Jan 2019 06:00) (116/55 - 154/80)  BP(mean): 110 (21 Jan 2019 06:00) (79 - 110)  ABP: --  ABP(mean): --  RR: 19 (21 Jan 2019 06:00) (11 - 29)  SpO2: 99% (21 Jan 2019 06:00) (89% - 100%)        01-20 @ 07:01  -  01-21 @ 07:00  --------------------------------------------------------  IN: 343 mL / OUT: 0 mL / NET: 343 mL      CAPILLARY BLOOD GLUCOSE      POCT Blood Glucose.: 240 mg/dL (20 Jan 2019 21:03)      PHYSICAL EXAM:  General:   HEENT:   Lymph Nodes:  Neck:   Respiratory:   Cardiovascular:   Abdomen:   Extremities:   Skin:   Neurological:  Psychiatry:    LINES:    HOSPITAL MEDICATIONS:  Standing Meds:  aspirin enteric coated 81 milliGRAM(s) Oral daily  atorvastatin 20 milliGRAM(s) Oral at bedtime  cinacalcet 60 milliGRAM(s) Oral daily  clopidogrel Tablet 75 milliGRAM(s) Oral daily  dextrose 5%. 1000 milliLiter(s) IV Continuous <Continuous>  dextrose 50% Injectable 12.5 Gram(s) IV Push once  dextrose 50% Injectable 25 Gram(s) IV Push once  dextrose 50% Injectable 25 Gram(s) IV Push once  DULoxetine 60 milliGRAM(s) Oral daily  heparin  Injectable 5000 Unit(s) SubCutaneous every 8 hours  insulin glargine Injectable (LANTUS) 6 Unit(s) SubCutaneous at bedtime  insulin lispro (HumaLOG) corrective regimen sliding scale   SubCutaneous three times a day before meals  insulin lispro (HumaLOG) corrective regimen sliding scale   SubCutaneous at bedtime  insulin lispro Injectable (HumaLOG) 2 Unit(s) SubCutaneous three times a day before meals  multivitamin 1 Tablet(s) Oral daily  senna 2 Tablet(s) Oral at bedtime  sevelamer hydrochloride 800 milliGRAM(s) Oral three times a day      PRN Meds:  dextrose 40% Gel 15 Gram(s) Oral once PRN  glucagon  Injectable 1 milliGRAM(s) IntraMuscular once PRN  oxyCODONE    5 mG/acetaminophen 325 mG 1 Tablet(s) Oral every 4 hours PRN      LABS:                        9.3    6.3   )-----------( 205      ( 21 Jan 2019 00:17 )             27.4     Hgb Trend: 9.3<--, 8.8<--, 8.6<--, 10.3<--, 9.9<--  01-21    133<L>  |  90<L>  |  50<H>  ----------------------------<  265<H>  4.9   |  25  |  5.65<H>    Ca    7.7<L>      21 Jan 2019 06:25  Phos  5.6     01-21  Mg     2.3     01-21    TPro  5.8<L>  /  Alb  3.6  /  TBili  0.2  /  DBili  x   /  AST  82<H>  /  ALT  77<H>  /  AlkPhos  74  01-21    Creatinine Trend: 5.65<--, 5.22<--, 4.89<--, 4.76<--, 4.30<--, 4.05<--  PT/INR - ( 19 Jan 2019 15:52 )   PT: 13.9 sec;   INR: 1.21 ratio         PTT - ( 19 Jan 2019 15:52 )  PTT:27.5 sec      Venous Blood Gas:  01-21 @ 00:12  7.38/51/49/30/77  VBG Lactate: 0.9  Venous Blood Gas:  01-20 @ 15:41  7.40/48/43/30/71  VBG Lactate: 0.9  Venous Blood Gas:  01-20 @ 10:00  7.42/49/44/31/76  VBG Lactate: 1.0  Venous Blood Gas:  01-19 @ 19:22  7.36/33/52/18/78  VBG Lactate: 7.1  Venous Blood Gas:  01-19 @ 15:52  7.36/45/32/24/43  VBG Lactate: 4.2      MICROBIOLOGY:     Culture - Blood (collected 19 Jan 2019 21:40)  Source: .Blood Blood  Preliminary Report (20 Jan 2019 22:01):    No growth to date.    Culture - Blood (collected 19 Jan 2019 21:40)  Source: .Blood Blood  Preliminary Report (20 Jan 2019 22:01):    No growth to date.      RADIOLOGY:  [ ] Reviewed and interpreted by me    EKG: CHIEF COMPLAINT:    Interval Events: No overnight events, repeat BMP today with anion gap of 18. Patient well appearing responding to questions to  nausea, vomitting, abd pain, SOB, with good BM's.       REVIEW OF SYSTEMS:  CONSTITUTIONAL: No weakness, fevers or chills  EYES/ENT: No visual changes;  No vertigo or throat pain   NECK: No pain or stiffness  RESPIRATORY: No cough, wheezing, hemoptysis; No shortness of breath  CARDIOVASCULAR: No chest pain or palpitations  GASTROINTESTINAL: No abdominal or epigastric pain. No nausea, vomiting, or hematemesis; No diarrhea or constipation. No melena or hematochezia.  GENITOURINARY: No dysuria, frequency or hematuria  NEUROLOGICAL: No numbness or weakness  SKIN: No itching, burning, rashes, or lesions   All other review of systems is negative unless indicated above.    OBJECTIVE:  ICU Vital Signs Last 24 Hrs  T(C): 36.8 (21 Jan 2019 04:00), Max: 36.8 (20 Jan 2019 16:00)  T(F): 98.2 (21 Jan 2019 04:00), Max: 98.2 (20 Jan 2019 16:00)  HR: 76 (21 Jan 2019 06:00) (61 - 78)  BP: 154/80 (21 Jan 2019 06:00) (116/55 - 154/80)  BP(mean): 110 (21 Jan 2019 06:00) (79 - 110)  ABP: --  ABP(mean): --  RR: 19 (21 Jan 2019 06:00) (11 - 29)  SpO2: 99% (21 Jan 2019 06:00) (89% - 100%)    01-20 @ 07:01  -  01-21 @ 07:00  --------------------------------------------------------  IN: 343 mL / OUT: 0 mL / NET: 343 mL    CAPILLARY BLOOD GLUCOSE    POCT Blood Glucose.: 240 mg/dL (20 Jan 2019 21:03)      PHYSICAL EXAM:  GENERAL: NAD, well-developed  HEAD:  Atraumatic, Normocephalic  EYES: EOMI, , conjunctiva and sclera clear  NECK: Supple, No JVD  CHEST/LUNG: Clear to auscultation bilaterally; No wheeze  HEART: Regular rate and rhythm; No murmurs, rubs, or gallops  ABDOMEN: Soft, Nontender, Nondistended; Bowel sounds present  EXTREMITIES:, 1+ pitting edema   PSYCH: AAOx3  NEUROLOGY: non-focal  SKIN: No rashes or lesions    LINES:    HOSPITAL MEDICATIONS:  Standing Meds:  aspirin enteric coated 81 milliGRAM(s) Oral daily  atorvastatin 20 milliGRAM(s) Oral at bedtime  cinacalcet 60 milliGRAM(s) Oral daily  clopidogrel Tablet 75 milliGRAM(s) Oral daily  dextrose 5%. 1000 milliLiter(s) IV Continuous <Continuous>  dextrose 50% Injectable 12.5 Gram(s) IV Push once  dextrose 50% Injectable 25 Gram(s) IV Push once  dextrose 50% Injectable 25 Gram(s) IV Push once  DULoxetine 60 milliGRAM(s) Oral daily  heparin  Injectable 5000 Unit(s) SubCutaneous every 8 hours  insulin glargine Injectable (LANTUS) 6 Unit(s) SubCutaneous at bedtime  insulin lispro (HumaLOG) corrective regimen sliding scale   SubCutaneous three times a day before meals  insulin lispro (HumaLOG) corrective regimen sliding scale   SubCutaneous at bedtime  insulin lispro Injectable (HumaLOG) 2 Unit(s) SubCutaneous three times a day before meals  multivitamin 1 Tablet(s) Oral daily  senna 2 Tablet(s) Oral at bedtime  sevelamer hydrochloride 800 milliGRAM(s) Oral three times a day    PRN Meds:  dextrose 40% Gel 15 Gram(s) Oral once PRN  glucagon  Injectable 1 milliGRAM(s) IntraMuscular once PRN  oxyCODONE    5 mG/acetaminophen 325 mG 1 Tablet(s) Oral every 4 hours PRN      LABS:                        9.3    6.3   )-----------( 205      ( 21 Jan 2019 00:17 )             27.4     Hgb Trend: 9.3<--, 8.8<--, 8.6<--, 10.3<--, 9.9<--  01-21    133<L>  |  90<L>  |  50<H>  ----------------------------<  265<H>  4.9   |  25  |  5.65<H>    Ca    7.7<L>      21 Jan 2019 06:25  Phos  5.6     01-21  Mg     2.3     01-21    TPro  5.8<L>  /  Alb  3.6  /  TBili  0.2  /  DBili  x   /  AST  82<H>  /  ALT  77<H>  /  AlkPhos  74  01-21    Creatinine Trend: 5.65<--, 5.22<--, 4.89<--, 4.76<--, 4.30<--, 4.05<--  PT/INR - ( 19 Jan 2019 15:52 )   PT: 13.9 sec;   INR: 1.21 ratio         PTT - ( 19 Jan 2019 15:52 )  PTT:27.5 sec    Venous Blood Gas:  01-21 @ 00:12  7.38/51/49/30/77  VBG Lactate: 0.9  Venous Blood Gas:  01-20 @ 15:41  7.40/48/43/30/71  VBG Lactate: 0.9  Venous Blood Gas:  01-20 @ 10:00  7.42/49/44/31/76  VBG Lactate: 1.0  Venous Blood Gas:  01-19 @ 19:22  7.36/33/52/18/78  VBG Lactate: 7.1  Venous Blood Gas:  01-19 @ 15:52  7.36/45/32/24/43  VBG Lactate: 4.2      MICROBIOLOGY:     Culture - Blood (collected 19 Jan 2019 21:40)  Source: .Blood Blood  Preliminary Report (20 Jan 2019 22:01):    No growth to date.    Culture - Blood (collected 19 Jan 2019 21:40)  Source: .Blood Blood  Preliminary Report (20 Jan 2019 22:01):    No growth to date.      RADIOLOGY:  [ ] Reviewed and interpreted by me    EKG:

## 2023-01-27 NOTE — PROGRESS NOTES
[facility] Medicine  Progress Note    Patient Name: Yunier Aguillon  MRN: [id number]  Patient Class: IP- Inpatient   Admission Date: 1/22/2023  Length of Stay: 4 days  Attending Physician: Angella Connors*  Primary Care Provider: Tamir Daniels MD        Subjective:     Principal Problem:Atrial fibrillation with RVR        HPI:  Yunier Aguillon is 89yr old male with PMH of dementia, HLD, HTN, Afib, and BPH who presents with SOB.     Due to dementia and difficulty hearing, the pt wasn't able to give much of a story. Per EMS, pt presented with SOB that began on Sunday evening, with no associated fever, cough, or phlegm production. He was noted to be sating around 85% on RA, while in route. He was also noted to be in Afib with RVR, and he received 10mg of metoprolol. In addition, facility reports that pt has been a bit more combative then normal.     In ED, triage vitals were significant for tachycadia and tachypnea. CBC was significant for leukocytosis. CMP was significant for hypernatremia, elevated glucose, and elevated ALT. BNP was 1,266 and troponin was .095. CT head showed no acute intracranial hemorrhage, with lacunar type infarct in the left side of the qing. CXR showed no focal consolidation, although pt with course interstitial findings.     Pt was started on a dilitazem drip, with some improvement in the RVR     Medicine was consulted for admission for Afib with RVR treatment        Overview/Hospital Course:  No notes on file    Interval History:  Awake and alert, sitting up in bed. No reported event, heart rate improved    Appreciate cardiology recs-likely stress-induced RVR in the setting of dehydration and infection.  Continue Eliquis and beta-blocker  Possible discharge today to nursing home with hospice      Review of Systems   Unable to perform ROS: Dementia   Objective:     Vital Signs (Most Recent):  Temp: 97.8 °F (36.6 °C) (01/27/23 1137)  Pulse: (!) 117 (01/27/23  1137)  Resp: 18 (01/27/23 1137)  BP: (!) 135/93 (01/27/23 1137)  SpO2: 95 % (01/27/23 1137)   Vital Signs (24h Range):  Temp:  [97.1 °F (36.2 °C)-98 °F (36.7 °C)] 97.8 °F (36.6 °C)  Pulse:  [] 117  Resp:  [16-19] 18  SpO2:  [93 %-96 %] 95 %  BP: (114-135)/(71-96) 135/93     Weight: 83.2 kg (183 lb 6.8 oz)  Body mass index is 25.58 kg/m².    Intake/Output Summary (Last 24 hours) at 1/27/2023 1147  Last data filed at 1/27/2023 0516  Gross per 24 hour   Intake --   Output 300 ml   Net -300 ml        Physical Exam  Vitals and nursing note reviewed.   Constitutional:       Appearance: He is not ill-appearing.   HENT:      Head: Normocephalic and atraumatic.      Comments: Hard of hearing  Neck:      Vascular: No carotid bruit.   Cardiovascular:      Rate and Rhythm: Normal rate. Rhythm irregular.      Heart sounds: No murmur heard.  Pulmonary:      Effort: No respiratory distress.   Abdominal:      Tenderness: There is no abdominal tenderness. There is no guarding.   Genitourinary:     Rectum: Guaiac result negative.   Musculoskeletal:         General: No swelling or deformity.      Cervical back: No tenderness.   Skin:     Findings: No erythema or rash.   Neurological:      Mental Status: He is disoriented.      Sensory: No sensory deficit.      Comments: Unable to communicate       Significant Labs: A1C: No results for input(s): HGBA1C in the last 4320 hours.  ABGs:   No results for input(s): PH, PCO2, HCO3, POCSATURATED, BE, TOTALHB, COHB, METHB, O2HB, POCFIO2, PO2 in the last 48 hours.    BMP:   Recent Labs   Lab 01/27/23  0402         K 4.0   *   CO2 23   BUN 23   CREATININE 0.8   CALCIUM 8.2*       CBC:   No results for input(s): WBC, HGB, HCT, PLT in the last 48 hours.    CMP:   Recent Labs   Lab 01/26/23  1806 01/26/23  2353 01/27/23  0402   * 143 142   K 4.2 4.1 4.0   * 109 112*   CO2 25 24 23   GLU 91 114* 103   BUN 24 24 23   CREATININE 0.8 0.9 0.8   CALCIUM 8.6* 8.4* 8.2*    ANIONGAP 10 10 7*       Troponin:   No results for input(s): TROPONINI, TROPONINIHS in the last 48 hours.    TSH: No results for input(s): TSH in the last 4320 hours.  Urine Culture: No results for input(s): LABURIN in the last 48 hours.  Urine Studies:   No results for input(s): COLORU, APPEARANCEUA, PHUR, SPECGRAV, PROTEINUA, GLUCUA, KETONESU, BILIRUBINUA, OCCULTUA, NITRITE, UROBILINOGEN, LEUKOCYTESUR, RBCUA, WBCUA, BACTERIA, SQUAMEPITHEL, HYALINECASTS in the last 48 hours.    Invalid input(s): WRIGHTSUR      Significant Imaging: I have reviewed all pertinent imaging results/findings within the past 24 hours.      Assessment/Plan:      * Atrial fibrillation with RVR  Patient with Persistent (7 days or more) atrial fibrillation which is controlled currently with Diltazpam. Patient is currently in atrial fibrillation.[id number] Score: 3. HASBLED Score: 2+. Anticoagulation indicated. Anticoagulation done with Apixaban.    ---------------------------------------------------------  Unclear etiology for what caused pt to go into Afib with RVR. CXR without obvious pneumonia. UA is pending. No diarrhea or GI symptoms.       Plan:  Treat underlying etiology  C/w diliazem drip. Consider transitioning to orals in AM.  Discontinued due to low EF  C/w apixaban  Consult Cardiology- appreciate recs continue with Eliquis and up titrate beta-blocker        Advance care planning    Advance Care Planning     Date: 01/24/2023    Code Status  In light of the patients advanced and life limiting illness,I engaged the the family in a conversation about the patient's preferences for care  at the very end of life. The patient wishes to have a natural, peaceful death.  Along those lines, the patient does not wish to have CPR or other invasive treatments performed when his heart and/or breathing stops. I communicated to the family that a DNR order would be placed in his medical record to reflect this preference.  I spent a total of 20  minutes engaging the patient in this advance care planning discussion.       [facility]  I engaged the family in a conversation about advance care planning and we specifically addressed what the goals of care would be moving forward, in light of the patient's change in clinical status, specifically progressive dementia.  We did specifically address the patient's likely prognosis, which is poor.  We explored the patient's values and preferences for future care.  The family endorses that what is most important right now is to focus on quality of life, even if it means sacrificing a little time and improvement in condition but with limits to invasive therapies    Accordingly, we have decided that the best plan to meet the patient's goals includes continuing with treatment    I did not explain the role for hospice care at this stage of the patient's illness, including its ability to help the patient live with the best quality of life possible.  We will not be making a hospice referral.    I spent a total of 25 minutes engaging the patient in this advance care planning discussion.     Advance Care Planning     Date: 01/26/2023    [facility]  I engaged the family in a conversation about advance care planning and we specifically addressed what the goals of care would be moving forward, in light of the patient's change in clinical status, specifically progressive dementia.  We did specifically address the patient's likely prognosis, which is poor.  We explored the patient's values and preferences for future care.  The family endorses that what is most important right now is to focus on spending time at home, symptom/pain control and quality of life, even if it means sacrificing a little time    Accordingly, we have decided that the best plan to meet the patient's goals includes continuing with treatment    I did explain the role for hospice care at this stage of the patient's illness, including its ability to help the patient live with  the best quality of life possible.  We will be making a hospice referral.    I spent a total of 20 minutes engaging the patient in this advance care planning discussion.                 Lacunar stroke  Noted on CT    Plan:  C/w ASA and Apixaban  Hold statin due to slightly elevated LFTs      Hypernatremia  Possible due to poor oral intake.    Initial Na: 149, with subsequent Na; 154 -down trending and resolved    Plan:  ->s/p 2L of NS in ED  ->start D5W at 100cc an hour  ->BMP every 6 hours  ->consider nephrology consult      Vascular dementia without behavioral disturbance  C/w home donepezil 5mg daily  Discharge with hospice    Hypothyroidism, unspecified  C/w synthroid 50mcg dose      Hyperlipidemia  Hold statin due to slighted elevated LFTs      Hypertension  C/w home amlodipine 10mg-discontinue  C/w home losartan 50mg        VTE Risk Mitigation (From admission, onward)         Ordered     apixaban tablet 5 mg  2 times daily         01/23/23 0028     IP VTE HIGH RISK PATIENT  Once         01/23/23 0024     Place sequential compression device  Until discontinued         01/23/23 0024                Discharge Planning   SHENA: 1/27/2023     Code Status: DNR   Is the patient medically ready for discharge?:     Reason for patient still in hospital (select all that apply): Pending disposition  Discharge Plan A: Return to nursing home                  Angella Connors MD  Department of Hospital Medicine   [facility]

## 2023-01-27 NOTE — DISCHARGE SUMMARY
[facility] Medicine  Discharge Summary      Patient Name: Yunier Aguillon  MRN: [id number]  LALA: [id number]  Patient Class: IP- Inpatient  Admission Date: 1/22/2023  Hospital Length of Stay: 4 days  Discharge Date and Time: 1/27/2023  2:00 PM  Attending Physician: Angella Connors*   Discharging Provider: Angella Connors MD  Primary Care Provider: Tamir Daniels MD    Primary Care Team: Networked reference to record PCT     HPI:   Yunier Aguillon is 89yr old male with PMH of dementia, HLD, HTN, Afib, and BPH who presents with SOB.     Due to dementia and difficulty hearing, the pt wasn't able to give much of a story. Per EMS, pt presented with SOB that began on Sunday evening, with no associated fever, cough, or phlegm production. He was noted to be sating around 85% on RA, while in route. He was also noted to be in Afib with RVR, and he received 10mg of metoprolol. In addition, facility reports that pt has been a bit more combative then normal.     In ED, triage vitals were significant for tachycadia and tachypnea. CBC was significant for leukocytosis. CMP was significant for hypernatremia, elevated glucose, and elevated ALT. BNP was 1,266 and troponin was .095. CT head showed no acute intracranial hemorrhage, with lacunar type infarct in the left side of the qing. CXR showed no focal consolidation, although pt with course interstitial findings.     Pt was started on a dilitazem drip, with some improvement in the RVR     Medicine was consulted for admission for Afib with RVR treatment        * No surgery found *      Hospital Course:   No notes on file     Goals of Care Treatment Preferences:  Code Status: DNR          What is most important right now is to focus on quality of life, even if it means sacrificing a little time, improvement in condition but with limits to invasive therapies, spending time at home, symptom/pain control, quality of life, even if it means sacrificing a  little time.  Accordingly, we have decided that the best plan to meet the patient's goals includes continuing with treatment, continuing with treatment.      Consults:   Consults (From admission, onward)          Status Ordering Provider     Inpatient consult to Social Work  Once        Provider:  (Not yet assigned)    Acknowledged INNOCENT-RAZIA GAR     Inpatient consult to Cardiology-[facility]  Once        Provider:  (Not yet assigned)    Completed JULIETTE SIMON            * Atrial fibrillation with RVR  Patient with Persistent (7 days or more) atrial fibrillation which is controlled currently with Diltazpam. Patient is currently in atrial fibrillation.[id number] Score: 3. HASBLED Score: 2+. Anticoagulation indicated. Anticoagulation done with Apixaban.    ---------------------------------------------------------  Unclear etiology for what caused pt to go into Afib with RVR. CXR without obvious pneumonia. UA is pending. No diarrhea or GI symptoms.       Plan:  Treat underlying etiology  C/w diliazem drip. Consider transitioning to orals in AM.  Discontinued due to low EF  C/w apixaban  Consult Cardiology- appreciate recs continue with Eliquis and up titrate beta-blocker        Advance care planning    Advance Care Planning     Date: 01/24/2023    Code Status  In light of the patients advanced and life limiting illness,I engaged the the family in a conversation about the patient's preferences for care  at the very end of life. The patient wishes to have a natural, peaceful death.  Along those lines, the patient does not wish to have CPR or other invasive treatments performed when his heart and/or breathing stops. I communicated to the family that a DNR order would be placed in his medical record to reflect this preference.  I spent a total of 20 minutes engaging the patient in this advance care planning discussion.       [facility]  I engaged the family in a conversation about advance care planning and we  specifically addressed what the goals of care would be moving forward, in light of the patient's change in clinical status, specifically progressive dementia.  We did specifically address the patient's likely prognosis, which is poor.  We explored the patient's values and preferences for future care.  The family endorses that what is most important right now is to focus on quality of life, even if it means sacrificing a little time and improvement in condition but with limits to invasive therapies    Accordingly, we have decided that the best plan to meet the patient's goals includes continuing with treatment    I did not explain the role for hospice care at this stage of the patient's illness, including its ability to help the patient live with the best quality of life possible.  We will not be making a hospice referral.    I spent a total of 25 minutes engaging the patient in this advance care planning discussion.     Advance Care Planning     Date: 01/26/2023    [facility]  I engaged the family in a conversation about advance care planning and we specifically addressed what the goals of care would be moving forward, in light of the patient's change in clinical status, specifically progressive dementia.  We did specifically address the patient's likely prognosis, which is poor.  We explored the patient's values and preferences for future care.  The family endorses that what is most important right now is to focus on spending time at home, symptom/pain control and quality of life, even if it means sacrificing a little time    Accordingly, we have decided that the best plan to meet the patient's goals includes continuing with treatment    I did explain the role for hospice care at this stage of the patient's illness, including its ability to help the patient live with the best quality of life possible.  We will be making a hospice referral.    I spent a total of 20 minutes engaging the patient in this advance care planning  discussion.                 Lacunar stroke  Noted on CT    Plan:  C/w ASA and Apixaban  Hold statin due to slightly elevated LFTs      Hypernatremia  Possible due to poor oral intake.    Initial Na: 149, with subsequent Na; 154 -down trending and resolved    Plan:  ->s/p 2L of NS in ED  ->start D5W at 100cc an hour  ->BMP every 6 hours  ->consider nephrology consult      Vascular dementia without behavioral disturbance  C/w home donepezil 5mg daily  Discharge with hospice    Hypothyroidism, unspecified  C/w synthroid 50mcg dose      Hyperlipidemia  Hold statin due to slighted elevated LFTs      Hypertension  C/w home amlodipine 10mg-discontinue  C/w home losartan 50mg        Final Active Diagnoses:    Diagnosis Date Noted POA    PRINCIPAL PROBLEM:  Atrial fibrillation with RVR [I48.91] 12/17/2018 Yes     Chronic    Advance care planning [Z71.89] 01/24/2023 Not Applicable    Hypernatremia [E87.0] 01/23/2023 Yes    Lacunar stroke [I63.81] 01/23/2023 Yes    Hypertension [I10] 04/16/2021 Yes    Vascular dementia without behavioral disturbance [F01.50] 12/07/2020 Yes    Hyperlipidemia [E78.5] 07/17/2019 Yes    Hypothyroidism, unspecified [E03.9] 04/22/2017 Yes    Coronary artery disease involving coronary bypass graft of native heart without angina pectoris [I25.810] 08/07/2015 Yes    History of transcatheter aortic valve replacement (TAVR) [Z95.2] 07/24/2014 Not Applicable      Problems Resolved During this Admission:       Discharged Condition: stable    Disposition: Home or Self Care    Follow Up:    Patient Instructions:      Ambulatory referral/consult to Urology   Standing Status: Future   Referral Priority: Routine Referral Type: Consultation   Referral Reason: Specialty Services Required   Requested Specialty: Urology   Number of Visits Requested: 1     Diet Cardiac     Activity as tolerated         Pending Diagnostic Studies:       Procedure Component Value Units Date/Time    Basic metabolic panel [[id number]]      Order Status: Sent Lab Status: No result     Specimen: Blood            Medications:  Reconciled Home Medications:      Medication List        START taking these medications      apixaban 5 mg Tab  Commonly known as: ELIQUIS  Take 1 tablet (5 mg total) by mouth 2 (two) times daily.            CHANGE how you take these medications      donepeziL 5 MG tablet  Commonly known as: ARICEPT  Take 1 tablet (5 mg total) by mouth once daily.  What changed:   See the new instructions.  Another medication with the same name was removed. Continue taking this medication, and follow the directions you see here.     EUTHYROX 50 MCG tablet  Generic drug: levothyroxine  Take 1 tablet (50 mcg total) by mouth every morning.  What changed: when to take this     metoprolol tartrate 50 MG tablet  Commonly known as: LOPRESSOR  Take 2 tablets (100 mg total) by mouth 2 (two) times daily.  What changed:   medication strength  how much to take  when to take this            CONTINUE taking these medications      aspirin 81 MG Chew  Take 81 mg by mouth once daily.     atorvastatin 40 MG tablet  Commonly known as: LIPITOR  Take 40 mg by mouth once daily.     bisacodyL 10 mg Supp  Commonly known as: DULCOLAX  Place 10 mg rectally daily as needed.     CALMOSEPTINE 0.44-20.6 % Oint  Generic drug: menthol-zinc oxide  Apply topically daily as needed. Apply to buttocks daily with each shower and as needed for diaper changes     cholecalciferol (vitamin D3) 50 mcg (2,000 unit) Tab  Commonly known as: VITAMIN D3  Take 4,000 Units by mouth once daily. Take 2 tablets by mouth daily     HEMORRHOIDAL(PE-MIN OIL-CHANDNI) 0.25-14-74.9 % Oint  Generic drug: phenyleph-min oil-petrolatum  Place 1 applicator rectally 4 (four) times daily as needed.     LORazepam 0.5 MG tablet  Commonly known as: ATIVAN  Take 0.5 mg by mouth every 6 (six) hours as needed.     losartan 25 MG tablet  Commonly known as: COZAAR  Take 25 mg by mouth once daily.     magnesium oxide 400  mg (241.3 mg magnesium) tablet  Commonly known as: MAG-OX  Take 400 mg by mouth every evening.     polyethylene glycol 17 gram/dose powder  Commonly known as: GLYCOLAX  Take 17 g by mouth once daily.     potassium chloride SA 10 MEQ tablet  Commonly known as: K-DUR,KLOR-CON M  Take 10 mEq by mouth every morning. Take with Furosemide 20 mg     QUEtiapine 25 MG Tab  Commonly known as: SEROQUEL  Take 25 mg by mouth once daily.     sertraline 50 MG tablet  Commonly known as: ZOLOFT  Take 50 mg by mouth once daily.     tamsulosin 0.4 mg Cap  Commonly known as: FLOMAX  Take 0.4 mg by mouth once daily.     valproic acid 250 mg capsule  Commonly known as: DEPAKENE  Take 250 mg by mouth 2 (two) times daily.     zinc oxide 20 % ointment  Apply topically as needed. Apply to affected areas as needed for redness and skin irritation            STOP taking these medications      furosemide 20 MG tablet  Commonly known as: LASIX              Indwelling Lines/Drains at time of discharge:   Lines/Drains/Airways       None                   Time spent on the discharge of patient: 35 minutes         Angella Connors MD  Department of Hospital Medicine  [place] [facility]

## 2023-01-27 NOTE — PLAN OF CARE
"RN Case  Order Review      Date:  01/272023    Discharging Facility noted:   Return to [facility] with Hospice    Med List Reconciled?:     Yes    Lines noted:  PIV x 2. Pt be removed prior to DC. Cabrales was removed on yesterday for voiding trail. Pt was unable to void and DC was held until today.    LBM: )1/24/2023  Woundcare/Wound vac:  NA    ABX with end date: NA  Discharging with Line:  O2 at 2L per NC at DC. Cabrales cath.    Ins auth:   NA    Intake forms completed:  NA      No future appointments.    BP (!) 127/90   Pulse (!) 114   Temp 97.8 °F (36.6 °C)   Resp 18   Ht 5' 11" (1.803 m)   Wt 83.2 kg (183 lb 6.8 oz)   SpO2 (!) 94%   BMI 25.58 kg/m²        Medication List        START taking these medications      apixaban 5 mg Tab  Commonly known as: ELIQUIS  Take 1 tablet (5 mg total) by mouth 2 (two) times daily.            CHANGE how you take these medications      donepeziL 5 MG tablet  Commonly known as: ARICEPT  Take 1 tablet (5 mg total) by mouth once daily.  What changed:   See the new instructions.  Another medication with the same name was removed. Continue taking this medication, and follow the directions you see here.     EUTHYROX 50 MCG tablet  Generic drug: levothyroxine  Take 1 tablet (50 mcg total) by mouth every morning.  What changed: when to take this     metoprolol tartrate 50 MG tablet  Commonly known as: LOPRESSOR  Take 2 tablets (100 mg total) by mouth 2 (two) times daily.  What changed:   medication strength  how much to take  when to take this            CONTINUE taking these medications      aspirin 81 MG Chew     atorvastatin 40 MG tablet  Commonly known as: LIPITOR     bisacodyL 10 mg Supp  Commonly known as: DULCOLAX     CALMOSEPTINE 0.44-20.6 % Oint  Generic drug: menthol-zinc oxide     cholecalciferol (vitamin D3) 50 mcg (2,000 unit) Tab  Commonly known as: VITAMIN D3     HEMORRHOIDAL(PE-MIN OIL-CHANDNI) 0.25-14-74.9 % Oint  Generic drug: phenyleph-min " oil-petrolatum     LORazepam 0.5 MG tablet  Commonly known as: ATIVAN     losartan 25 MG tablet  Commonly known as: COZAAR     magnesium oxide 400 mg (241.3 mg magnesium) tablet  Commonly known as: MAG-OX     polyethylene glycol 17 gram/dose powder  Commonly known as: GLYCOLAX     potassium chloride SA 10 MEQ tablet  Commonly known as: K-DUR,KLOR-CON M     QUEtiapine 25 MG Tab  Commonly known as: SEROQUEL     sertraline 50 MG tablet  Commonly known as: ZOLOFT     tamsulosin 0.4 mg Cap  Commonly known as: FLOMAX     valproic acid 250 mg capsule  Commonly known as: DEPAKENE     zinc oxide 20 % ointment            STOP taking these medications      furosemide 20 MG tablet  Commonly known as: LASIX               Where to Get Your Medications        These medications were sent to [facility] [address], [place] [id number]      Hours: Mon-Fri, 8a-5:30p Phone: [phone]   metoprolol tartrate 50 MG tablet       Information about where to get these medications is not yet available    Ask your nurse or doctor about these medications  apixaban 5 mg Tab  donepeziL 5 MG tablet

## 2023-01-30 NOTE — PLAN OF CARE
[facility] - Telemetry  Discharge Final Note    Primary Care Provider: Tamir Daniels MD    Expected Discharge Date: 1/27/2023    Final Discharge Note (most recent)       Final Note - 01/30/23 0731          Final Note    Assessment Type Final Discharge Note (P)      Anticipated Discharge Disposition alf Nursing Home (P)    [facility]       Post-Acute Status    Post-Acute Authorization Placement;Hospice (P)      Post-Acute Placement Status Set-up Complete/Auth obtained (P)    [facility] w    Hospice Status Set-up Complete/Auth obtained (P)    [facility]    Discharge Delays [facility] Arranged Transportation (P)                      I

## 2023-02-02 ENCOUNTER — OUTSIDE PLACE OF SERVICE (OUTPATIENT)
Dept: FAMILY MEDICINE | Facility: CLINIC | Age: 88
End: 2023-02-02
Payer: OTHER GOVERNMENT

## 2023-02-02 PROCEDURE — 99499 NO LOS: ICD-10-PCS | Mod: ,,, | Performed by: FAMILY MEDICINE

## 2023-02-02 PROCEDURE — 99499 UNLISTED E&M SERVICE: CPT | Mod: ,,, | Performed by: FAMILY MEDICINE

## 2023-03-10 NOTE — PLAN OF CARE
Pt on RA, no respiratory distress noted. Will continue to monitor.     regular rate and rhythm/S1 S2 present
